# Patient Record
Sex: MALE | Race: WHITE | NOT HISPANIC OR LATINO | Employment: OTHER | URBAN - METROPOLITAN AREA
[De-identification: names, ages, dates, MRNs, and addresses within clinical notes are randomized per-mention and may not be internally consistent; named-entity substitution may affect disease eponyms.]

---

## 2019-10-24 ENCOUNTER — TRANSCRIBE ORDERS (OUTPATIENT)
Dept: LAB | Facility: HOSPITAL | Age: 69
End: 2019-10-24

## 2019-10-24 ENCOUNTER — LAB (OUTPATIENT)
Dept: LAB | Facility: HOSPITAL | Age: 69
End: 2019-10-24

## 2019-10-24 DIAGNOSIS — I82.4Z9 DEEP VEIN THROMBOSIS (DVT) OF DISTAL VEIN OF LOWER EXTREMITY, UNSPECIFIED CHRONICITY, UNSPECIFIED LATERALITY (HCC): ICD-10-CM

## 2019-10-24 DIAGNOSIS — I26.99 PULMONARY EMBOLISM, UNSPECIFIED CHRONICITY, UNSPECIFIED PULMONARY EMBOLISM TYPE, UNSPECIFIED WHETHER ACUTE COR PULMONALE PRESENT (HCC): ICD-10-CM

## 2019-10-24 DIAGNOSIS — I26.99 PULMONARY EMBOLISM, UNSPECIFIED CHRONICITY, UNSPECIFIED PULMONARY EMBOLISM TYPE, UNSPECIFIED WHETHER ACUTE COR PULMONALE PRESENT (HCC): Primary | ICD-10-CM

## 2019-10-24 LAB
F5 GENE MUT ANL BLD/T: NORMAL
FACTOR II, DNA ANALYSIS: NORMAL
HCYS SERPL-MCNC: 12.3 UMOL/L (ref 0–15)

## 2019-10-24 PROCEDURE — 85613 RUSSELL VIPER VENOM DILUTED: CPT

## 2019-10-24 PROCEDURE — 36415 COLL VENOUS BLD VENIPUNCTURE: CPT

## 2019-10-24 PROCEDURE — 86146 BETA-2 GLYCOPROTEIN ANTIBODY: CPT

## 2019-10-24 PROCEDURE — 85303 CLOT INHIBIT PROT C ACTIVITY: CPT

## 2019-10-24 PROCEDURE — 85306 CLOT INHIBIT PROT S FREE: CPT

## 2019-10-24 PROCEDURE — 81241 F5 GENE: CPT

## 2019-10-24 PROCEDURE — 83090 ASSAY OF HOMOCYSTEINE: CPT

## 2019-10-24 PROCEDURE — 85300 ANTITHROMBIN III ACTIVITY: CPT

## 2019-10-24 PROCEDURE — 83520 IMMUNOASSAY QUANT NOS NONAB: CPT

## 2019-10-24 PROCEDURE — 86147 CARDIOLIPIN ANTIBODY EA IG: CPT

## 2019-10-24 PROCEDURE — 85705 THROMBOPLASTIN INHIBITION: CPT

## 2019-10-24 PROCEDURE — 85670 THROMBIN TIME PLASMA: CPT

## 2019-10-24 PROCEDURE — 85732 THROMBOPLASTIN TIME PARTIAL: CPT

## 2019-10-24 PROCEDURE — 81240 F2 GENE: CPT

## 2019-10-25 LAB
AT III PPP CHRO-ACNC: 103 % (ref 90–134)
CARDIOLIPIN IGA SER IA-ACNC: <9 APL U/ML (ref 0–11)
CARDIOLIPIN IGG SER IA-ACNC: <9 GPL U/ML (ref 0–14)
CARDIOLIPIN IGM SER IA-ACNC: 9 MPL U/ML (ref 0–12)
PROT C ACT/NOR PPP: 109 % (ref 86–163)
PROT S ACT/NOR PPP: >150 % (ref 70–127)

## 2019-10-26 LAB
B2 GLYCOPROT1 IGA SER-ACNC: <9 GPI IGA UNITS (ref 0–25)
B2 GLYCOPROT1 IGG SER-ACNC: 31 GPI IGG UNITS (ref 0–20)
B2 GLYCOPROT1 IGM SER-ACNC: <9 GPI IGM UNITS (ref 0–32)
DRVVT IMM 1:2 NP PPP: 83.2 SEC (ref 0–47)
LA NT DPL PPP: 70.8 SEC (ref 0–55)
LA NT DPL/LA NT HPL PPP-RTO: 0.95 RATIO (ref 0–1.4)
LA NT PLATELET PPP: 51 SEC (ref 0–51.9)
LUPUS ANTICOAGULANT REFLEX: ABNORMAL
SCREEN DRVVT: 1.9 RATIO (ref 0.8–1.2)
SCREEN DRVVT: 119.3 SEC (ref 0–47)
THROMBIN TIME: 19.1 SEC (ref 0–23)

## 2019-10-31 LAB
ANTI-PHOSPHATIDIC ACID: ABNORMAL
ANTI-PHOSPHATIDIC,IGA: 11 U/ML
ANTI-PHOSPHATIDIC,IGG: 6.7 U/ML
ANTI-PHOSPHATIDIC,IGM: 7.2 U/ML
ANTI-PHOSPHATIDYL GLYCEROL, IGA: 7.3 U/ML
ANTI-PHOSPHATIDYL GLYCEROL, IGG: 10.1 U/ML
ANTI-PHOSPHATIDYL GLYCEROL, IGM: 5.4 U/ML
ANTI-PHOSPHATIDYL GLYCEROL: ABNORMAL
ANTI-PHOSPHATIDYL INOSITOL: ABNORMAL
ANTI-PHOSPHATIDYL,IGA: 4.6 U/ML
ANTI-PHOSPHATIDYL,IGG: 3 U/ML
ANTI-PHOSPHATIDYL,IGM: 3 U/ML
ANTI-PHOSPHATIDYLETHANOLAMINE, IGA: 3.2 U/ML
ANTI-PHOSPHATIDYLETHANOLAMINE, IGG: 33.3 U/ML
ANTI-PHOSPHATIDYLETHANOLAMINE, IGM: 2.4 U/ML
ANTI-PHOSPHATIDYLETHANOLAMINE: ABNORMAL

## 2020-01-07 ENCOUNTER — CONSULT (OUTPATIENT)
Dept: ONCOLOGY | Facility: CLINIC | Age: 70
End: 2020-01-07

## 2020-01-07 ENCOUNTER — LAB (OUTPATIENT)
Dept: LAB | Facility: HOSPITAL | Age: 70
End: 2020-01-07

## 2020-01-07 ENCOUNTER — HOSPITAL ENCOUNTER (OUTPATIENT)
Dept: CT IMAGING | Facility: HOSPITAL | Age: 70
End: 2020-01-07

## 2020-01-07 ENCOUNTER — HOSPITAL ENCOUNTER (OUTPATIENT)
Dept: CARDIOLOGY | Facility: HOSPITAL | Age: 70
Discharge: HOME | End: 2020-01-07
Admitting: INTERNAL MEDICINE

## 2020-01-07 VITALS
HEIGHT: 71 IN | DIASTOLIC BLOOD PRESSURE: 79 MMHG | TEMPERATURE: 97.6 F | SYSTOLIC BLOOD PRESSURE: 148 MMHG | BODY MASS INDEX: 29.71 KG/M2 | OXYGEN SATURATION: 98 % | RESPIRATION RATE: 16 BRPM | WEIGHT: 212.2 LBS | HEART RATE: 60 BPM

## 2020-01-07 DIAGNOSIS — M32.9 LUPUS (HCC): Primary | ICD-10-CM

## 2020-01-07 DIAGNOSIS — I26.99 BILATERAL PULMONARY EMBOLISM (HCC): ICD-10-CM

## 2020-01-07 DIAGNOSIS — I82.411 ACUTE DEEP VEIN THROMBOSIS (DVT) OF FEMORAL VEIN OF RIGHT LOWER EXTREMITY (HCC): ICD-10-CM

## 2020-01-07 DIAGNOSIS — R76.0 LUPUS ANTICOAGULANT POSITIVE: Primary | ICD-10-CM

## 2020-01-07 DIAGNOSIS — Z80.0 FAMILY HISTORY OF COLON CANCER: ICD-10-CM

## 2020-01-07 LAB
BASOPHILS # BLD AUTO: 0.05 10*3/MM3 (ref 0–0.2)
BASOPHILS NFR BLD AUTO: 0.5 % (ref 0–1.5)
BH CV LOW VAS RIGHT GASTRONEMIUS VESSEL: 1
BH CV LOWER VASCULAR LEFT COMMON FEMORAL AUGMENT: NORMAL
BH CV LOWER VASCULAR LEFT COMMON FEMORAL COMPETENT: NORMAL
BH CV LOWER VASCULAR LEFT COMMON FEMORAL COMPRESS: NORMAL
BH CV LOWER VASCULAR LEFT COMMON FEMORAL PHASIC: NORMAL
BH CV LOWER VASCULAR LEFT COMMON FEMORAL SPONT: NORMAL
BH CV LOWER VASCULAR LEFT DISTAL FEMORAL COMPRESS: NORMAL
BH CV LOWER VASCULAR LEFT GASTRONEMIUS COMPRESS: NORMAL
BH CV LOWER VASCULAR LEFT GREATER SAPH AK COMPRESS: NORMAL
BH CV LOWER VASCULAR LEFT GREATER SAPH BK COMPRESS: NORMAL
BH CV LOWER VASCULAR LEFT MID FEMORAL AUGMENT: NORMAL
BH CV LOWER VASCULAR LEFT MID FEMORAL COMPETENT: NORMAL
BH CV LOWER VASCULAR LEFT MID FEMORAL COMPRESS: NORMAL
BH CV LOWER VASCULAR LEFT MID FEMORAL PHASIC: NORMAL
BH CV LOWER VASCULAR LEFT MID FEMORAL SPONT: NORMAL
BH CV LOWER VASCULAR LEFT PERONEAL COMPRESS: NORMAL
BH CV LOWER VASCULAR LEFT POPLITEAL AUGMENT: NORMAL
BH CV LOWER VASCULAR LEFT POPLITEAL COMPETENT: NORMAL
BH CV LOWER VASCULAR LEFT POPLITEAL COMPRESS: NORMAL
BH CV LOWER VASCULAR LEFT POPLITEAL PHASIC: NORMAL
BH CV LOWER VASCULAR LEFT POPLITEAL SPONT: NORMAL
BH CV LOWER VASCULAR LEFT POSTERIOR TIBIAL COMPRESS: NORMAL
BH CV LOWER VASCULAR LEFT PROFUNDA FEMORAL COMPRESS: NORMAL
BH CV LOWER VASCULAR LEFT PROXIMAL FEMORAL COMPRESS: NORMAL
BH CV LOWER VASCULAR LEFT SAPHENOFEMORAL JUNCTION COMPRESS: NORMAL
BH CV LOWER VASCULAR RIGHT COMMON FEMORAL AUGMENT: NORMAL
BH CV LOWER VASCULAR RIGHT COMMON FEMORAL COMPETENT: NORMAL
BH CV LOWER VASCULAR RIGHT COMMON FEMORAL COMPRESS: NORMAL
BH CV LOWER VASCULAR RIGHT COMMON FEMORAL PHASIC: NORMAL
BH CV LOWER VASCULAR RIGHT COMMON FEMORAL SPONT: NORMAL
BH CV LOWER VASCULAR RIGHT DISTAL FEMORAL COMPRESS: NORMAL
BH CV LOWER VASCULAR RIGHT DISTAL FEMORAL THROMBUS: NORMAL
BH CV LOWER VASCULAR RIGHT GASTRONEMIUS COMPRESS: NORMAL
BH CV LOWER VASCULAR RIGHT GASTRONEMIUS THROMBUS: NORMAL
BH CV LOWER VASCULAR RIGHT GREATER SAPH AK COMPRESS: NORMAL
BH CV LOWER VASCULAR RIGHT GREATER SAPH BK COMPRESS: NORMAL
BH CV LOWER VASCULAR RIGHT MID FEMORAL AUGMENT: NORMAL
BH CV LOWER VASCULAR RIGHT MID FEMORAL COMPETENT: NORMAL
BH CV LOWER VASCULAR RIGHT MID FEMORAL COMPRESS: NORMAL
BH CV LOWER VASCULAR RIGHT MID FEMORAL PHASIC: NORMAL
BH CV LOWER VASCULAR RIGHT MID FEMORAL SPONT: NORMAL
BH CV LOWER VASCULAR RIGHT PERONEAL COMPRESS: NORMAL
BH CV LOWER VASCULAR RIGHT POPLITEAL AUGMENT: NORMAL
BH CV LOWER VASCULAR RIGHT POPLITEAL COMPETENT: NORMAL
BH CV LOWER VASCULAR RIGHT POPLITEAL COMPRESS: NORMAL
BH CV LOWER VASCULAR RIGHT POPLITEAL PHASIC: NORMAL
BH CV LOWER VASCULAR RIGHT POPLITEAL SPONT: NORMAL
BH CV LOWER VASCULAR RIGHT POPLITEAL THROMBUS: NORMAL
BH CV LOWER VASCULAR RIGHT POSTERIOR TIBIAL COMPRESS: NORMAL
BH CV LOWER VASCULAR RIGHT PROFUNDA FEMORAL COMPRESS: NORMAL
BH CV LOWER VASCULAR RIGHT PROXIMAL FEMORAL COMPRESS: NORMAL
BH CV LOWER VASCULAR RIGHT SAPHENOFEMORAL JUNCTION COMPRESS: NORMAL
DEPRECATED RDW RBC AUTO: 47.3 FL (ref 37–54)
EOSINOPHIL # BLD AUTO: 0.19 10*3/MM3 (ref 0–0.4)
EOSINOPHIL NFR BLD AUTO: 1.8 % (ref 0.3–6.2)
ERYTHROCYTE [DISTWIDTH] IN BLOOD BY AUTOMATED COUNT: 14.3 % (ref 12.3–15.4)
HCT VFR BLD AUTO: 46.4 % (ref 37.5–51)
HGB BLD-MCNC: 14.7 G/DL (ref 13–17.7)
IMM GRANULOCYTES # BLD AUTO: 0.04 10*3/MM3 (ref 0–0.05)
IMM GRANULOCYTES NFR BLD AUTO: 0.4 % (ref 0–0.5)
LYMPHOCYTES # BLD AUTO: 2.54 10*3/MM3 (ref 0.7–3.1)
LYMPHOCYTES NFR BLD AUTO: 24.1 % (ref 19.6–45.3)
MCH RBC QN AUTO: 28.5 PG (ref 26.6–33)
MCHC RBC AUTO-ENTMCNC: 31.7 G/DL (ref 31.5–35.7)
MCV RBC AUTO: 90.1 FL (ref 79–97)
MONOCYTES # BLD AUTO: 0.84 10*3/MM3 (ref 0.1–0.9)
MONOCYTES NFR BLD AUTO: 8 % (ref 5–12)
NEUTROPHILS # BLD AUTO: 6.9 10*3/MM3 (ref 1.7–7)
NEUTROPHILS NFR BLD AUTO: 65.2 % (ref 42.7–76)
NRBC BLD AUTO-RTO: 0 /100 WBC (ref 0–0.2)
PLATELET # BLD AUTO: 283 10*3/MM3 (ref 140–450)
PMV BLD AUTO: 9.1 FL (ref 6–12)
RBC # BLD AUTO: 5.15 10*6/MM3 (ref 4.14–5.8)
WBC NRBC COR # BLD: 10.56 10*3/MM3 (ref 3.4–10.8)

## 2020-01-07 PROCEDURE — 99205 OFFICE O/P NEW HI 60 MIN: CPT | Performed by: INTERNAL MEDICINE

## 2020-01-07 PROCEDURE — 93970 EXTREMITY STUDY: CPT

## 2020-01-07 PROCEDURE — 36415 COLL VENOUS BLD VENIPUNCTURE: CPT

## 2020-01-07 PROCEDURE — 85025 COMPLETE CBC W/AUTO DIFF WBC: CPT

## 2020-01-07 RX ORDER — RIVAROXABAN 20 MG/1
TABLET, FILM COATED ORAL
COMMUNITY
Start: 2020-01-06 | End: 2020-05-26 | Stop reason: SDUPTHER

## 2020-01-07 RX ORDER — LORATADINE 10 MG/1
10 TABLET ORAL AS NEEDED
COMMUNITY

## 2020-01-07 RX ORDER — MONTELUKAST SODIUM 10 MG/1
TABLET ORAL
COMMUNITY
Start: 2019-12-23 | End: 2020-11-10 | Stop reason: SDDI

## 2020-01-07 NOTE — PROGRESS NOTES
1/7/20 Bilateral LEV duplex preliminary findings show right leg and calf sub-acute DVT involving the popliteal vein. Preliminary report given to Dr. Rivera and pt is pending re-schedule for CT due to allergy to contrast. Pt released per Dr. Rivera's f/u instructions.

## 2020-01-07 NOTE — PROGRESS NOTES
Subjective     REASON FOR CONSULTATION:  Provide an opinion on any further workup or treatment on:    · Bilateral pulmonary embolism  · Right lower extremity DVT  · Positive test for antiphospholipid antibodies                       REQUESTING PHYSICIAN: Dr. Waite    RECORDS OBTAINED: Records of the patients history including those obtained from the referring provider were reviewed and summarized in detail.    HISTORY OF PRESENT ILLNESS:    Jerad Torres is a 69 y.o. patient who was referred for evaluation of evaluation of DVT, pulmonary embolism and positive antiphospholipid antibody tests.  Patient was at his usual state of health until August 2019 when he went on a cruise to the [facility].  He was feeling tired at that time and his wife states that he did not do much on the trip.  The patient reports that he started feeling tired quite easily.  He felt that his energy level decreased to about 60% of his normal.  He was not experiencing chest pain or shortness of breath.  Patient went on a car trip to [place].  The trip took about 8-1/2 hours.  They took 2 breaks on the way.  When he started walking on the beach 2 days after, he started having what he described as muscle cramps in the right leg.  The symptoms worsened as he was going up the stairs.  His sister who has a history of DVT noticed that his leg was swollen and was larger than the left.  He was not experiencing chest pain but he was feeling tired.  He was taken to urgent care and a venous Doppler was done that showed DVT from the femoral vein to the calf.  He went to the ER and was admitted to [facility].  CT angiogram of the chest revealed extensive bilateral pulmonary embolism.  He was hospitalized.  He was started on IV heparin drip.  His oxygen level improved to 94-96%.  After a 3-day hospital stay, he was switched to Xarelto twice daily and was discharged.  He was asked to wait 1 week before traveling back by plane to [place].    When the patient came back to [place], he saw  Dr. Josh Waite.  He had thrombophilia work-up done which came back positive for antiphospholipid antibodies.  He was referred to our office.    Patient states that his energy level is currently at 70% (was 60% at the time of diagnosis of the DVT and PE).  He did not notice significant improvement in this fatigue.  He continues to have swelling of the right leg despite the use of compression stockings.  He reported to his wife that he had muscle cramps in the left leg yesterday which was similar to what he experienced when he developed the right lower extremity DVT.    Patient is taking Xarelto regularly which is currently once daily.  He had a recent episode of hitting the left forearm on an object.  He did not notice significant bleeding from the area.       REVIEW OF SYSTEMS:  Review of Systems   Constitutional: Positive for fatigue. Negative for chills, fever and unexpected weight change.   HENT: Negative for mouth sores, nosebleeds, sore throat and voice change.    Eyes: Negative for visual disturbance.   Respiratory: Positive for cough and shortness of breath.    Cardiovascular: Negative for chest pain and leg swelling.   Gastrointestinal: Negative for abdominal pain, blood in stool, constipation, diarrhea, nausea and vomiting.        Hemorrhoids   Genitourinary: Negative for dysuria, frequency and hematuria.   Musculoskeletal: Negative for arthralgias, back pain and joint swelling.   Skin: Negative for rash.   Neurological: Negative for dizziness, numbness and headaches.   Hematological: Negative for adenopathy. Does not bruise/bleed easily.   Psychiatric/Behavioral: Negative for dysphoric mood. The patient is not nervous/anxious.          Past Medical History:   Diagnosis Date   • Allergic rhinitis    • Hyperlipidemia    • MICHAEL (obstructive sleep apnea)    • Pulmonary embolism (CMS/[facility]) 10/2019    RLE       Past Surgical History:   Procedure  "Laterality Date   • BACK SURGERY     • NEPHROLITHOTOMY         Social History     Socioeconomic History   • Marital status:      Spouse name: Sara   • Number of children: Not on file   • Years of education: Not on file   • Highest education level: Not on file   Occupational History     Employer: RETIRED   Tobacco Use   • Smoking status: Never Smoker   • Smokeless tobacco: Never Used   Substance and Sexual Activity   • Alcohol use: Never     Frequency: Never   • Drug use: Never   • Sexual activity: Defer       Cancer-related family history includes Colon cancer in his father.   Sister has history of DVT and is on Eliquis.    MEDICATIONS:    Current Outpatient Medications:   •  loratadine (CLARITIN) 10 MG tablet, Take 10 mg by mouth Daily., Disp: , Rfl:   •  montelukast (SINGULAIR) 10 MG tablet, TAKE ONE TABLET BY MOUTH ONCE A DAY IN THE EVENING, Disp: , Rfl:   •  XARELTO 20 MG tablet, , Disp: , Rfl:      ALLERGIES:  Allergies   Allergen Reactions   • Sulfa Antibiotics Nausea Only        Objective   VITAL SIGNS:  Vitals:    01/07/20 1447   BP: 148/79   Pulse: 60   Resp: 16   Temp: 97.6 °F (36.4 °C)   TempSrc: Oral   SpO2: 98%   Weight: 96.3 kg (212 lb 3.2 oz)   Height: 180 cm (70.87\")  Comment: new ht   PainSc: 0-No pain       Wt Readings from Last 3 Encounters:   01/07/20 96.3 kg (212 lb 3.2 oz)       PHYSICAL EXAMINATION  GENERAL:  The patient appears in good general condition, not in acute distress.  SKIN: Warm and dry. No skin rashes. No ecchymosis.  HEAD:  Normocephalic.  EYES:  No Jaundice. No Pallor. Pupils equal. EOMI.  NECK:  Supple with Good ROM. No Thyromegaly. No Masses.  LYMPHATICS:  No cervical or supraclavicular lymphadenopathy.  CHEST: Normal respiratory effort. Lungs clear to auscultation.   CARDIAC:  No edema.  ABDOMEN:  Soft. No tenderness. No Hepatomegaly. No Splenomegaly. No masses.  EXTREMITIES:  No clubbing.  Right calf is larger than the left.  There is slightly prominent superficial " veins in the right medial aspect of the leg.  No Calf tenderness.  NEUROLOGICAL:  No Focal neurological deficits.       RESULT REVIEW:   Results from last 7 days   Lab Units 01/07/20  1436   WBC 10*3/mm3 10.56   NEUTROS ABS 10*3/mm3 6.90   HEMOGLOBIN g/dL 14.7   HEMATOCRIT % 46.4   PLATELETS 10*3/mm3 283       Component      Latest Ref Rng & Units 10/24/2019   Anti-Phosphatidyl,IgA      <12.0 U/mL 4.6   Anti-Phosphatidyl,IgG      <12.0 U/mL 3.0   Anti-Phosphatidyl,IgM      <12.0 U/mL 3.0   Anti-Phosphatidic Acid       Comment   Anti-Phosphatidic,IgA      <12.0 U/mL 11.0   Anti-Phosphatidic,IgG      <12.0 U/mL 6.7   Anti-Phosphatidic,IgM      <12.0 U/mL 7.2   Anti-Phosphatidylethanolamine       Comment   Anti-Phosphatidylethanolamine, IgA      <12.0 U/mL 3.2   Anti-Phosphatidylethanolamine, IgG      <12.0 U/mL 33.3 (H)   Anti-Phosphatidylethanolamine, IgM      <12.0 U/mL 2.4   Anti-Phosphatidyl Glycerol       Comment   Anti-Phosphatidyl Glycerol, IgA      <12.0 U/mL 7.3   Anti-Phosphatidyl Glycerol, IgG      <12.0 U/mL 10.1   Anti-Phosphatidyl Glycerol, IgM      <12.0 U/mL 5.4   Anticardiolipin IgG      0 - 14 GPL U/mL <9   Anticardiolipin IgM      0 - 12 MPL U/mL 9   Anticardiolipin IgA      0 - 11 APL U/mL <9   Beta-2 Glyco 1 IgG      0 - 20 GPI IgG units 31 (H)   Beta-2 Glyco 1 IgA      0 - 25 GPI IgA units <9   Beta-2 Glyco 1 IgM      0 - 32 GPI IgM units <9   Factor II, DNA Analysis      Normal Normal   ANTITHROMBIN ACTIVITY      90 - 134 % 103   Factor V Leiden      Normal Normal   Protein C Activity      86 - 163 % 109   Protein S Functional      70 - 127 % >150 (H)   Homocysteine, Plasma      0.0 - 15.0 umol/L 12.3       Assessment/Plan   1.  Bilateral pulmonary embolism diagnosed on 10/8/2019.  The CT scan at [facility] reported bilateral extensive pulmonary embolism.  The largest was in the right main pulmonary artery into the lobar arteries.  There was suspected infarction in the left lung  base.    2.  Acute right lower extremity deep vein thrombosis.  Also diagnosed on 10/8/2019.  The DVT and PE developed after driving by car to [place].  Although this appears to be the triggering factor, he has gone on this trip numerous times in the past without ever developing DVT or PE.    3.  Positive test for lupus anticoagulant, anti-phosphatidyl ethanolamine IgG antibody and anti-beta-2 glycoprotein IgG antibody.  While the positive lupus anticoagulant may have been a false positive test due to Xarelto, the other 2 positive antibodies are concerning for an underlying antiphospholipid syndrome.  The patient's family history is positive for DVT in a sister, she had negative thrombophilia testing.  Patient does not have a hereditary thrombophilia on the blood work-up from 10/24/2019.  I explained to the patient and his wife that antiphospholipid syndrome, if confirmed through repeat testing, is an acquired condition.  He does not need to have family members tested for this condition since it is acquired.    4.  Family history positive for colon cancer.  Patient is undergoing colonoscopies every 5 years.  The last colonoscopy was clear.  We discussed the need to continue with this schedule every 5 years and he is very agreeable.    PLAN:    1.  I will obtain anticardiolipin, anti-beta-2 glycoprotein and anti-phosphatidyl antibodies today.    2.  I will not repeat lupus anticoagulant since it would be expected to be positive with the presence of Xarelto.    3.  I will obtain a CT angiogram and a venous Doppler of both lower extremities in 1-2 days.    4.  I will see the patient follow-up in 1 week to review results.        Kat Umaña MD  01/07/20

## 2020-01-08 ENCOUNTER — TELEPHONE (OUTPATIENT)
Dept: ONCOLOGY | Facility: CLINIC | Age: 70
End: 2020-01-08

## 2020-01-08 LAB
CARDIOLIPIN IGA SER IA-ACNC: <9 APL U/ML (ref 0–11)
CARDIOLIPIN IGG SER IA-ACNC: <9 GPL U/ML (ref 0–14)
CARDIOLIPIN IGM SER IA-ACNC: <9 MPL U/ML (ref 0–12)

## 2020-01-08 RX ORDER — PREDNISONE 50 MG/1
TABLET ORAL
Qty: 3 TABLET | Refills: 0 | Status: SHIPPED | OUTPATIENT
Start: 2020-01-08 | End: 2020-11-10

## 2020-01-08 NOTE — TELEPHONE ENCOUNTER
Patient's wife called to see why premeds for CT were not sent in. Pt has an allergy to contrast dye that was not listed in pt's chart. This has been updated. Prednisone escribed to pt's pharmacy and verbal instructions provided for when to take both benadryl and prednisone prior to scan tomorrow morning. Pt's wife v/u.

## 2020-01-09 ENCOUNTER — HOSPITAL ENCOUNTER (OUTPATIENT)
Dept: CT IMAGING | Facility: HOSPITAL | Age: 70
Discharge: HOME | End: 2020-01-09
Admitting: INTERNAL MEDICINE

## 2020-01-09 ENCOUNTER — TELEPHONE (OUTPATIENT)
Dept: ONCOLOGY | Facility: HOSPITAL | Age: 70
End: 2020-01-09

## 2020-01-09 DIAGNOSIS — I26.99 BILATERAL PULMONARY EMBOLISM (HCC): ICD-10-CM

## 2020-01-09 LAB
B2 GLYCOPROT1 IGA SER-ACNC: <9 GPI IGA UNITS (ref 0–25)
B2 GLYCOPROT1 IGG SER-ACNC: 39 GPI IGG UNITS (ref 0–20)
B2 GLYCOPROT1 IGM SER-ACNC: <9 GPI IGM UNITS (ref 0–32)
CREAT BLDA-MCNC: 1 MG/DL (ref 0.6–1.3)
PS IGG SER-ACNC: 7 GPS IGG (ref 0–11)
PS IGM SER-ACNC: 23 MPS IGM (ref 0–25)

## 2020-01-09 PROCEDURE — 82565 ASSAY OF CREATININE: CPT

## 2020-01-09 PROCEDURE — 71275 CT ANGIOGRAPHY CHEST: CPT

## 2020-01-09 PROCEDURE — 0 IOPAMIDOL PER 1 ML: Performed by: INTERNAL MEDICINE

## 2020-01-09 RX ADMIN — IOPAMIDOL 95 ML: 755 INJECTION, SOLUTION INTRAVENOUS at 10:17

## 2020-01-09 NOTE — TELEPHONE ENCOUNTER
Arnulfo Vincent in scheduling who s/w Dr. Umaña, call pt's wife and inform her of results of the duplex scan. The right LE DVT is improving. He will see pt on Tuesday for f/u.  Informed pt's wife and she v/u. No further questions.

## 2020-01-09 NOTE — NURSING NOTE
Patient here for CTA Chest PE Protocol outpatient.  Wife states that they are wanting to see Dr. Umaña today if at all possible and they have been trying to contact his office all day without success and are having difficulty. They have an appt on 01/14/2020 with Dr. Umaña but they live 75 miles away and are concerned because he had a follow-up doppler to his legs and the DVT is unchanged, they feel that the Xarelto is not working and do not want to wait until the 14th to discuss this with Dr. Umaña.  Informed them that they will wait after the CT to have the Radiologist read this STAT and they will discuss results with Dr. Umaña while they are waiting here in Triage. D/w Yelena the Nurse Navigator at [facility].  She said that Dr. Umaña is here today and may possibly be able to see them today, she will mention this to him.  Informed patient and wife of above.

## 2020-01-10 ENCOUNTER — TELEPHONE (OUTPATIENT)
Dept: ONCOLOGY | Facility: HOSPITAL | Age: 70
End: 2020-01-10

## 2020-01-10 NOTE — TELEPHONE ENCOUNTER
Called pt and informed him of results. He v/u.       ----- Message from Kat Umaña MD sent at 1/10/2020  9:45 AM EST -----  Regarding: CT scan  Please inform the patient that the CT scan of the chest showed resolution of the blood clots.     Thank you

## 2020-01-14 ENCOUNTER — OFFICE VISIT (OUTPATIENT)
Dept: ONCOLOGY | Facility: CLINIC | Age: 70
End: 2020-01-14

## 2020-01-14 ENCOUNTER — APPOINTMENT (OUTPATIENT)
Dept: LAB | Facility: HOSPITAL | Age: 70
End: 2020-01-14

## 2020-01-14 VITALS
TEMPERATURE: 97.4 F | OXYGEN SATURATION: 98 % | RESPIRATION RATE: 16 BRPM | DIASTOLIC BLOOD PRESSURE: 81 MMHG | HEART RATE: 68 BPM | BODY MASS INDEX: 30.45 KG/M2 | WEIGHT: 217.5 LBS | HEIGHT: 71 IN | SYSTOLIC BLOOD PRESSURE: 149 MMHG

## 2020-01-14 DIAGNOSIS — I82.411 ACUTE DEEP VEIN THROMBOSIS (DVT) OF FEMORAL VEIN OF RIGHT LOWER EXTREMITY (HCC): Primary | ICD-10-CM

## 2020-01-14 DIAGNOSIS — J98.11 ATELECTASIS: ICD-10-CM

## 2020-01-14 DIAGNOSIS — R76.0 LUPUS ANTICOAGULANT POSITIVE: ICD-10-CM

## 2020-01-14 DIAGNOSIS — Z79.01 CHRONIC ANTICOAGULATION: ICD-10-CM

## 2020-01-14 DIAGNOSIS — I26.99 BILATERAL PULMONARY EMBOLISM (HCC): ICD-10-CM

## 2020-01-14 LAB
ANTI-PHOSPHATIDYLETHANOLAMINE, IGA: NORMAL
ANTI-PHOSPHATIDYLETHANOLAMINE, IGG: NORMAL
ANTI-PHOSPHATIDYLETHANOLAMINE, IGM: NORMAL

## 2020-01-14 PROCEDURE — 99215 OFFICE O/P EST HI 40 MIN: CPT | Performed by: INTERNAL MEDICINE

## 2020-01-14 NOTE — PROGRESS NOTES
Subjective     CHIEF COMPLAINT:      Chief Complaint   Patient presents with   • Follow-up     discuss ct scan and ultrasound       HISTORY OF PRESENT ILLNESS:     Jerad Torres is a 69 y.o. male patient who returns today for follow up on his DVT and PE. He continues anticoagulation with Xarelto 20 mg daily. He is tolerating it well. He is not having problem with bleeding or bruising. He is not having chest pain. He has shortness of breath with exertion but reports improvement.      Patient is using the compression stocking. He reports some right leg swelling especially toward the end of the day.         REVIEW OF SYSTEMS:  Review of Systems   Constitutional: Negative for chills, fever and unexpected weight change.   HENT: Negative for mouth sores, nosebleeds, sore throat and voice change.    Eyes: Negative for visual disturbance.   Respiratory: Negative for cough and shortness of breath.    Cardiovascular: Negative for chest pain and leg swelling.   Gastrointestinal: Negative for abdominal pain, blood in stool, constipation, diarrhea, nausea and vomiting.   Genitourinary: Negative for dysuria, frequency and hematuria.   Musculoskeletal: Negative for arthralgias, back pain and joint swelling.   Skin: Negative for rash.   Neurological: Negative for dizziness, numbness and headaches.   Hematological: Negative for adenopathy. Does not bruise/bleed easily.   Psychiatric/Behavioral: Negative for dysphoric mood. The patient is not nervous/anxious.      I verified the ROS obtained by the MA.      Past Medical History:   Diagnosis Date   • Allergic rhinitis    • Hyperlipidemia    • MICHAEL (obstructive sleep apnea)    • Pulmonary embolism (CMS/HCC) 10/2019    RLE       Past Surgical History:   Procedure Laterality Date   • BACK SURGERY     • NEPHROLITHOTOMY         Cancer-related family history includes Colon cancer (age of onset: 70) in his father.  Social History     Tobacco Use   • Smoking status: Never Smoker   • Smokeless  "tobacco: Never Used   Substance Use Topics   • Alcohol use: Never     Frequency: Never       MEDICATIONS:    Current Outpatient Medications:   •  loratadine (CLARITIN) 10 MG tablet, Take 10 mg by mouth As Needed., Disp: , Rfl:   •  montelukast (SINGULAIR) 10 MG tablet, TAKE ONE TABLET BY MOUTH ONCE A DAY IN THE EVENING, Disp: , Rfl:   •  predniSONE (DELTASONE) 50 MG tablet, Take prednisone 50 mg by mouth 13 hours, 7 hours and 1 hour prior to CT scan., Disp: 3 tablet, Rfl: 0  •  XARELTO 20 MG tablet, , Disp: , Rfl:     ALLERGIES:  Allergies   Allergen Reactions   • Contrast Dye Itching   • Sulfa Antibiotics Nausea Only         Objective   VITAL SIGNS:     Vitals:    01/14/20 1213   BP: 149/81   Pulse: 68   Resp: 16   Temp: 97.4 °F (36.3 °C)   TempSrc: Oral   SpO2: 98%   Weight: 98.7 kg (217 lb 8 oz)   Height: 180 cm (70.87\")   PainSc: 0-No pain     Body mass index is 30.45 kg/m².     Wt Readings from Last 3 Encounters:   01/14/20 98.7 kg (217 lb 8 oz)   01/07/20 96.3 kg (212 lb 3.2 oz)       PHYSICAL EXAMINATION:  GENERAL:  The patient appears in good general condition, not in acute distress.  SKIN: Warm and dry. No skin rashes. No ecchymosis.  HEAD:  Normocephalic.  EYES:  No Jaundice. No Pallor. Pupils equal. EOMI.  NECK:  Supple with Good ROM. No Thyromegaly. No Masses.  LYMPHATICS:  No cervical or supraclavicular lymphadenopathy.  CHEST: Normal respiratory effort.   CARDIAC:  No edema.  ABDOMEN:  Non-distended.  EXTREMITIES:  No clubbing. Right leg is slightly larger than the left. A small varicose vein in the right leg, non tender. No Calf tenderness.  NEUROLOGICAL:  No Focal neurological deficits.         DIAGNOSTIC DATA:     Results from last 7 days   Lab Units 01/07/20  1436   WBC 10*3/mm3 10.56   NEUTROS ABS 10*3/mm3 6.90   HEMOGLOBIN g/dL 14.7   HEMATOCRIT % 46.4   PLATELETS 10*3/mm3 283     Component      Latest Ref Rng & Units 1/7/2020   Beta-2 Glyco 1 IgG      0 - 20 GPI IgG units 39 (H)   Beta-2 Glyco 1 " IgA      0 - 25 GPI IgA units <9   Beta-2 Glyco 1 IgM      0 - 32 GPI IgM units <9   Anticardiolipin IgG      0 - 14 GPL U/mL <9   Anticardiolipin IgM      0 - 12 MPL U/mL <9   Anticardiolipin IgA      0 - 11 APL U/mL <9   Anti-Phosphatidylethanolamine, IgA          Anti-Phosphatidylethanolamine, IgG          Anti-Phosphatidylethanolamine, IgM          Antiphosphatidylserine IgM      0 - 25 MPS IgM 23   Antiphosphatidylserine IgG      0 - 11 GPS IgG 7       Venous Doppler of both lower extremities on 1/7/2020:  · Sub-acute right lower extremity deep vein thrombosis noted in the distal femoral, popliteal and gastrocnemius/soleal.  · All other veins appeared normal bilaterally.      CT ANGIOGRAM CHEST W WO CONTRAST on 1/9/2020:     Radiation dose reduction techniques were utilized, including automated  exposure control and exposure modulation based on body size.     Clinical: 69-year-old male, follow-up DVT right lower extremity,  bilateral pulmonary embolism 10/8/2019     CT scan of the chest with intravenous contrast, pulmonary embolus  protocol to include CT angiogram three-dimensional reconstruction     FINDINGS: Cardiac size within normal limits. No pericardial or  esophageal abnormality. Diameter of the aorta is normal. No aneurysm or  dissection. No mediastinal or hilar mass/adenopathy. There are small  calcified benign mediastinal and right hilar lymph nodes. There is  calcified benign granuloma within the right lower lobe.     At the base of the left upper and lower lobes there are linear areas of  discoid atelectasis or parenchymal scarring. No consolidation, pleural  effusion or suspicious pulmonary lesion demonstrated.     Thin axial CT Naty reconstructed images fail to demonstrate pulmonary  embolus.     CONCLUSION:  1. No pulmonary embolus demonstrated.  2. No acute airspace disease.        This report was finalized on 1/9/2020 10:45 AM by Dr. Pako Herrera M.D.    I personally reviewed the CT  angiogram of the chest with the patient and his wife during today's visit and I concur with the finding of resolution of the pulmonary embolism. There is evidence of atelectasis.      Assessment/Plan   1.  Right lower extremity deep vein thrombosis and bilateral pulmonary embolism diagnosed on 10/8/2019.    · The CT scan at [facility] reported bilateral extensive pulmonary embolism.    · The largest was in the right main pulmonary artery into the lobar arteries.    · There was suspected infarction in the left lung base.  · Venous doppler showed acute right lower extremity deep vein thrombosis in the femoral, popliteal and gastrocnemius veins.   · The DVT and PE developed after driving by car to [place].    · Patient was placed on IV heparin and discharged home on Xarelto.  · Venous doppler on 1/7/2020 showed the thrombosis to have become subacute.  · CT scan of the chest on 1/9/2020 showed complete resolution of the pulmonary embolism.   · Although this appears to be the triggering factor, he has gone on this trip numerous times in the past without ever developing DVT or PE.  · I explained the findings on the venous doppler that they show improvement in the thrombosis with evidence of blood flow.      2.  Positive test for lupus anticoagulant, anti-phosphatidyl ethanolamine IgG antibody and anti-beta-2 glycoprotein IgG antibody on 10/24/2019.   · The positive lupus anticoagulant may have been a false positive test due to Xarelto.  · the other 2 positive antibodies are concerning for an underlying antiphospholipid syndrome. However, only anti beta-2 glycoprotein antibody is considered clinically significant for a diagnosis of antiphospholipid syndrome.   · The patient's family history is positive for DVT in a sister who had a negative thrombophilia testing.   · Repeat testing on 1/7/2020 showed that the anti beta-2 glycoprotein was still positive. The titer was slightly higher. Anticardiolipin  antibody was negative.  · Since the patient is responding well to Xarelto and his PE has resolved, I recommended continuing Xarelto.  · I explained that in patients who are considered triple positive for the antibodies (Anticardiolipin Ab, Beta-2 Glycoprotein Ab and Lupus Anticoagulant), Warfarin is recommended over DOAC. The patient only has a definite single positive antibody from this panel.     3.  Lung atelectasis.  This is likely attributed to hypoventilation since the development of the pulmonary embolism.     4.  Family history positive for colon cancer.  He is having follow up colonoscopies every 5 years.     PLAN:    1. Continue Xarelto 20 mg daily.  2. Await the result of the remaining antiphospholipid antibody panel.   3.  Use compression stocking during the day time and especially when travelling.  4.  Return for follow up in 3 months with repeat venous doppler of the right lower extremity 1 week before his return visit.   5.  Due to the significant degree of pulmonary embolism, I recommended continuing prophylactic anticoagulation indefinitely.        Kat Umaña MD  01/14/20

## 2020-01-15 LAB
ANTI-PHOSPHATIDIC ACID: ABNORMAL
ANTI-PHOSPHATIDIC,IGA: 7.4 U/ML
ANTI-PHOSPHATIDIC,IGG: 3.6 U/ML
ANTI-PHOSPHATIDIC,IGM: 2.1 U/ML
ANTI-PHOSPHATIDYL GLYCEROL, IGA: 4.1 U/ML
ANTI-PHOSPHATIDYL GLYCEROL, IGG: 7.3 U/ML
ANTI-PHOSPHATIDYL GLYCEROL, IGM: 3 U/ML
ANTI-PHOSPHATIDYL GLYCEROL: ABNORMAL
ANTI-PHOSPHATIDYL INOSITOL: ABNORMAL
ANTI-PHOSPHATIDYL,IGA: 3.1 U/ML
ANTI-PHOSPHATIDYL,IGG: 2.8 U/ML
ANTI-PHOSPHATIDYL,IGM: 1.2 U/ML
ANTI-PHOSPHATIDYLETHANOLAMINE, IGA: 4.3 U/ML
ANTI-PHOSPHATIDYLETHANOLAMINE, IGG: 18.3 U/ML
ANTI-PHOSPHATIDYLETHANOLAMINE, IGM: 0.8 U/ML
ANTI-PHOSPHATIDYLETHANOLAMINE: ABNORMAL

## 2020-01-16 ENCOUNTER — TELEPHONE (OUTPATIENT)
Dept: ONCOLOGY | Facility: HOSPITAL | Age: 70
End: 2020-01-16

## 2020-01-16 NOTE — TELEPHONE ENCOUNTER
Called and spoke with pt, he v/u of continuing xarelto as planned.  Educated to call for questions or concerns        ----- Message from Kat Umaña MD sent at 1/15/2020  5:29 PM EST -----  Please inform the patient that the lab tests came back and 1 antibody is still positive but the level has improved since October 2019.  I would recommend continuing Xarelto as planned.    Thank you

## 2020-04-14 ENCOUNTER — APPOINTMENT (OUTPATIENT)
Dept: CARDIOLOGY | Facility: HOSPITAL | Age: 70
End: 2020-04-14

## 2020-05-14 ENCOUNTER — HOSPITAL ENCOUNTER (OUTPATIENT)
Dept: CARDIOLOGY | Facility: HOSPITAL | Age: 70
Discharge: HOME | End: 2020-05-14
Admitting: INTERNAL MEDICINE

## 2020-05-14 DIAGNOSIS — I82.411 ACUTE DEEP VEIN THROMBOSIS (DVT) OF FEMORAL VEIN OF RIGHT LOWER EXTREMITY (HCC): ICD-10-CM

## 2020-05-14 LAB
BH CV LOW VAS RIGHT POSTERIOR TIBIAL VESSEL: 1
BH CV LOWER VASCULAR LEFT COMMON FEMORAL AUGMENT: NORMAL
BH CV LOWER VASCULAR LEFT COMMON FEMORAL COMPETENT: NORMAL
BH CV LOWER VASCULAR LEFT COMMON FEMORAL COMPRESS: NORMAL
BH CV LOWER VASCULAR LEFT COMMON FEMORAL PHASIC: NORMAL
BH CV LOWER VASCULAR LEFT COMMON FEMORAL SPONT: NORMAL
BH CV LOWER VASCULAR RIGHT COMMON FEMORAL AUGMENT: NORMAL
BH CV LOWER VASCULAR RIGHT COMMON FEMORAL COMPETENT: NORMAL
BH CV LOWER VASCULAR RIGHT COMMON FEMORAL COMPRESS: NORMAL
BH CV LOWER VASCULAR RIGHT COMMON FEMORAL PHASIC: NORMAL
BH CV LOWER VASCULAR RIGHT COMMON FEMORAL SPONT: NORMAL
BH CV LOWER VASCULAR RIGHT DISTAL FEMORAL COMPRESS: NORMAL
BH CV LOWER VASCULAR RIGHT GASTRONEMIUS COMPRESS: NORMAL
BH CV LOWER VASCULAR RIGHT GREATER SAPH AK COMPRESS: NORMAL
BH CV LOWER VASCULAR RIGHT GREATER SAPH BK COMPRESS: NORMAL
BH CV LOWER VASCULAR RIGHT LESSER SAPH COMPRESS: NORMAL
BH CV LOWER VASCULAR RIGHT MID FEMORAL AUGMENT: NORMAL
BH CV LOWER VASCULAR RIGHT MID FEMORAL COMPETENT: NORMAL
BH CV LOWER VASCULAR RIGHT MID FEMORAL COMPRESS: NORMAL
BH CV LOWER VASCULAR RIGHT MID FEMORAL PHASIC: NORMAL
BH CV LOWER VASCULAR RIGHT MID FEMORAL SPONT: NORMAL
BH CV LOWER VASCULAR RIGHT PERONEAL COMPRESS: NORMAL
BH CV LOWER VASCULAR RIGHT POPLITEAL AUGMENT: NORMAL
BH CV LOWER VASCULAR RIGHT POPLITEAL COMPETENT: NORMAL
BH CV LOWER VASCULAR RIGHT POPLITEAL COMPRESS: NORMAL
BH CV LOWER VASCULAR RIGHT POPLITEAL PHASIC: NORMAL
BH CV LOWER VASCULAR RIGHT POPLITEAL SPONT: NORMAL
BH CV LOWER VASCULAR RIGHT POSTERIOR TIBIAL AUGMENT: NORMAL
BH CV LOWER VASCULAR RIGHT POSTERIOR TIBIAL COMPRESS: NORMAL
BH CV LOWER VASCULAR RIGHT POSTERIOR TIBIAL THROMBUS: NORMAL
BH CV LOWER VASCULAR RIGHT PROXIMAL FEMORAL COMPRESS: NORMAL
BH CV LOWER VASCULAR RIGHT SAPHENOFEMORAL JUNCTION COMPRESS: NORMAL

## 2020-05-14 PROCEDURE — 93971 EXTREMITY STUDY: CPT

## 2020-05-18 ENCOUNTER — APPOINTMENT (OUTPATIENT)
Dept: CARDIOLOGY | Facility: HOSPITAL | Age: 70
End: 2020-05-18

## 2020-05-21 ENCOUNTER — APPOINTMENT (OUTPATIENT)
Dept: LAB | Facility: HOSPITAL | Age: 70
End: 2020-05-21

## 2020-05-26 ENCOUNTER — OFFICE VISIT (OUTPATIENT)
Dept: ONCOLOGY | Facility: CLINIC | Age: 70
End: 2020-05-26

## 2020-05-26 ENCOUNTER — LAB (OUTPATIENT)
Dept: LAB | Facility: HOSPITAL | Age: 70
End: 2020-05-26

## 2020-05-26 VITALS
RESPIRATION RATE: 16 BRPM | WEIGHT: 210.6 LBS | SYSTOLIC BLOOD PRESSURE: 147 MMHG | BODY MASS INDEX: 29.48 KG/M2 | DIASTOLIC BLOOD PRESSURE: 79 MMHG | OXYGEN SATURATION: 95 % | TEMPERATURE: 97.3 F | HEIGHT: 71 IN | HEART RATE: 72 BPM

## 2020-05-26 DIAGNOSIS — I26.99 BILATERAL PULMONARY EMBOLISM (HCC): ICD-10-CM

## 2020-05-26 DIAGNOSIS — M79.652 LEFT THIGH PAIN: ICD-10-CM

## 2020-05-26 DIAGNOSIS — I82.411 ACUTE DEEP VEIN THROMBOSIS (DVT) OF FEMORAL VEIN OF RIGHT LOWER EXTREMITY (HCC): ICD-10-CM

## 2020-05-26 DIAGNOSIS — I26.99 BILATERAL PULMONARY EMBOLISM (HCC): Primary | ICD-10-CM

## 2020-05-26 DIAGNOSIS — Z79.01 CHRONIC ANTICOAGULATION: ICD-10-CM

## 2020-05-26 DIAGNOSIS — I82.551 CHRONIC DEEP VEIN THROMBOSIS (DVT) OF RIGHT PERONEAL VEIN (HCC): ICD-10-CM

## 2020-05-26 DIAGNOSIS — R76.0 LUPUS ANTICOAGULANT POSITIVE: ICD-10-CM

## 2020-05-26 PROBLEM — I82.511 CHRONIC DEEP VEIN THROMBOSIS (DVT) OF FEMORAL VEIN OF RIGHT LOWER EXTREMITY: Status: ACTIVE | Noted: 2020-01-07

## 2020-05-26 LAB
ALBUMIN SERPL-MCNC: 4.3 G/DL (ref 3.5–5.2)
ALBUMIN/GLOB SERPL: 1.5 G/DL (ref 1.1–2.4)
ALP SERPL-CCNC: 65 U/L (ref 38–116)
ALT SERPL W P-5'-P-CCNC: 34 U/L (ref 0–41)
ANION GAP SERPL CALCULATED.3IONS-SCNC: 11.4 MMOL/L (ref 5–15)
AST SERPL-CCNC: 22 U/L (ref 0–40)
BASOPHILS # BLD AUTO: 0.05 10*3/MM3 (ref 0–0.2)
BASOPHILS NFR BLD AUTO: 0.6 % (ref 0–1.5)
BILIRUB SERPL-MCNC: 0.3 MG/DL (ref 0.2–1.2)
BUN BLD-MCNC: 17 MG/DL (ref 6–20)
BUN/CREAT SERPL: 13.4 (ref 7.3–30)
CALCIUM SPEC-SCNC: 9.5 MG/DL (ref 8.5–10.2)
CHLORIDE SERPL-SCNC: 102 MMOL/L (ref 98–107)
CO2 SERPL-SCNC: 27.6 MMOL/L (ref 22–29)
CREAT BLD-MCNC: 1.27 MG/DL (ref 0.7–1.3)
DEPRECATED RDW RBC AUTO: 46.3 FL (ref 37–54)
EOSINOPHIL # BLD AUTO: 0.25 10*3/MM3 (ref 0–0.4)
EOSINOPHIL NFR BLD AUTO: 2.9 % (ref 0.3–6.2)
ERYTHROCYTE [DISTWIDTH] IN BLOOD BY AUTOMATED COUNT: 13.6 % (ref 12.3–15.4)
GFR SERPL CREATININE-BSD FRML MDRD: 56 ML/MIN/1.73
GLOBULIN UR ELPH-MCNC: 2.9 GM/DL (ref 1.8–3.5)
GLUCOSE BLD-MCNC: 105 MG/DL (ref 74–124)
HCT VFR BLD AUTO: 46.2 % (ref 37.5–51)
HGB BLD-MCNC: 14.8 G/DL (ref 13–17.7)
IMM GRANULOCYTES # BLD AUTO: 0.02 10*3/MM3 (ref 0–0.05)
IMM GRANULOCYTES NFR BLD AUTO: 0.2 % (ref 0–0.5)
LYMPHOCYTES # BLD AUTO: 2.53 10*3/MM3 (ref 0.7–3.1)
LYMPHOCYTES NFR BLD AUTO: 29.8 % (ref 19.6–45.3)
MCH RBC QN AUTO: 29.2 PG (ref 26.6–33)
MCHC RBC AUTO-ENTMCNC: 32 G/DL (ref 31.5–35.7)
MCV RBC AUTO: 91.3 FL (ref 79–97)
MONOCYTES # BLD AUTO: 0.7 10*3/MM3 (ref 0.1–0.9)
MONOCYTES NFR BLD AUTO: 8.2 % (ref 5–12)
NEUTROPHILS # BLD AUTO: 4.94 10*3/MM3 (ref 1.7–7)
NEUTROPHILS NFR BLD AUTO: 58.3 % (ref 42.7–76)
NRBC BLD AUTO-RTO: 0 /100 WBC (ref 0–0.2)
PLATELET # BLD AUTO: 275 10*3/MM3 (ref 140–450)
PMV BLD AUTO: 9.3 FL (ref 6–12)
POTASSIUM BLD-SCNC: 4.2 MMOL/L (ref 3.5–4.7)
PROT SERPL-MCNC: 7.2 G/DL (ref 6.3–8)
RBC # BLD AUTO: 5.06 10*6/MM3 (ref 4.14–5.8)
SODIUM BLD-SCNC: 141 MMOL/L (ref 134–145)
WBC NRBC COR # BLD: 8.49 10*3/MM3 (ref 3.4–10.8)

## 2020-05-26 PROCEDURE — 36415 COLL VENOUS BLD VENIPUNCTURE: CPT

## 2020-05-26 PROCEDURE — 99214 OFFICE O/P EST MOD 30 MIN: CPT | Performed by: INTERNAL MEDICINE

## 2020-05-26 PROCEDURE — 80053 COMPREHEN METABOLIC PANEL: CPT

## 2020-05-26 PROCEDURE — 85025 COMPLETE CBC W/AUTO DIFF WBC: CPT

## 2020-05-26 NOTE — PROGRESS NOTES
Subjective     CHIEF COMPLAINT:      Chief Complaint   Patient presents with   • Follow-up     discuss pain lt leg       HISTORY OF PRESENT ILLNESS:     Jerad Torres is a 69 y.o. male patient who returns today for follow up on his right lower extremity deep vein thrombosis.  He is on Xarelto 20 mg daily. He is tolerating it without problem with pain or swelling in the right leg.     Patient reports developing pain in the left thigh that started 1 week ago. He reports having strenuous exercise the days that preceded the onset of the pain. He repots long standing history of back pain.       REVIEW OF SYSTEMS:  Review of Systems   Constitutional: Negative for fatigue, fever and unexpected weight change.   HENT: Negative for nosebleeds and voice change.    Eyes: Negative for visual disturbance.   Respiratory: Negative for cough and shortness of breath.    Cardiovascular: Negative for chest pain and leg swelling.   Gastrointestinal: Negative for abdominal pain, blood in stool, constipation, diarrhea, nausea and vomiting.   Genitourinary: Negative for frequency and hematuria.   Musculoskeletal: Negative for back pain and joint swelling.   Skin: Negative for rash.   Neurological: Negative for dizziness and headaches.   Hematological: Negative for adenopathy. Does not bruise/bleed easily.   Psychiatric/Behavioral: Negative for dysphoric mood. The patient is not nervous/anxious.      Past Medical History:   Diagnosis Date   • Allergic rhinitis    • Hyperlipidemia    • MICHAEL (obstructive sleep apnea)    • Pulmonary embolism (CMS/HCC) 10/2019    RLE       Past Surgical History:   Procedure Laterality Date   • BACK SURGERY     • NEPHROLITHOTOMY         Cancer-related family history includes Colon cancer (age of onset: 70) in his father.  Social History     Tobacco Use   • Smoking status: Never Smoker   • Smokeless tobacco: Never Used   Substance Use Topics   • Alcohol use: Never     Frequency: Never       MEDICATIONS:    Current  "Outpatient Medications:   •  loratadine (CLARITIN) 10 MG tablet, Take 10 mg by mouth As Needed., Disp: , Rfl:   •  montelukast (SINGULAIR) 10 MG tablet, TAKE ONE TABLET BY MOUTH ONCE A DAY IN THE EVENING, Disp: , Rfl:   •  XARELTO 20 MG tablet, , Disp: , Rfl:   •  predniSONE (DELTASONE) 50 MG tablet, Take prednisone 50 mg by mouth 13 hours, 7 hours and 1 hour prior to CT scan., Disp: 3 tablet, Rfl: 0    ALLERGIES:  Allergies   Allergen Reactions   • Contrast Dye Itching   • Sulfa Antibiotics Nausea Only         Objective   VITAL SIGNS:     Vitals:    05/26/20 1629   BP: 147/79   Pulse: 72   Resp: 16   Temp: 97.3 °F (36.3 °C)   TempSrc: Temporal   SpO2: 95%   Weight: 95.5 kg (210 lb 9.6 oz)   Height: 180 cm (70.87\")   PainSc:   5   PainLoc: Leg     Body mass index is 29.48 kg/m².     Wt Readings from Last 3 Encounters:   05/26/20 95.5 kg (210 lb 9.6 oz)   01/14/20 98.7 kg (217 lb 8 oz)   01/07/20 96.3 kg (212 lb 3.2 oz)       PHYSICAL EXAMINATION:  GENERAL:  The patient appears in good general condition, not in acute distress.  SKIN: No skin rashes. No ecchymosis.  HEAD:  Normocephalic.  EYES:  No Jaundice. No Pallor. Pupils equal. EOMI.  NECK:  Supple with Good ROM.  CHEST: Normal respiratory effort.   CARDIAC:  No edema.  ABDOMEN:  Non-distended.  EXTREMITIES: no size difference between the right and left legs. No erythema or warmth. No thigh or calf calf tenderness.  NEUROLOGICAL:  No Focal neurological deficits.       DIAGNOSTIC DATA:     Results from last 7 days   Lab Units 05/26/20  1618   WBC 10*3/mm3 8.49   NEUTROS ABS 10*3/mm3 4.94   HEMOGLOBIN g/dL 14.8   HEMATOCRIT % 46.2   PLATELETS 10*3/mm3 275     Results from last 7 days   Lab Units 05/26/20  1618   SODIUM mmol/L 141   POTASSIUM mmol/L 4.2   CHLORIDE mmol/L 102   CO2 mmol/L 27.6   BUN mg/dL 17   CREATININE mg/dL 1.27   CALCIUM mg/dL 9.5   ALBUMIN g/dL 4.30   BILIRUBIN mg/dL 0.3   ALK PHOS U/L 65   ALT (SGPT) U/L 34   AST (SGOT) U/L 22   GLUCOSE mg/dL " 105     Component      Latest Ref Rng & Units 10/24/2019 1/7/2020 1/7/2020            3:56 PM  3:56 PM   Anti-Phosphatidyl Inositol       Comment  Comment   Anti-Phosphatidyl,IgA      <12.0 U/mL 4.6  3.1   Anti-Phosphatidyl,IgG      <12.0 U/mL 3.0  2.8   Anti-Phosphatidyl,IgM      <12.0 U/mL 3.0  1.2   Anti-Phosphatidic Acid       Comment  Comment   Anti-Phosphatidic,IgA      <12.0 U/mL 11.0  7.4   Anti-Phosphatidic,IgG      <12.0 U/mL 6.7  3.6   Anti-Phosphatidic,IgM      <12.0 U/mL 7.2  2.1   Anti-Phosphatidylethanolamine       Comment  Comment   Anti-Phosphatidylethanolamine, IgA       3.2  4.3   Anti-Phosphatidylethanolamine, IgG       33.3 (H)  18.3 (H)   Anti-Phosphatidylethanolamine, IgM       2.4  0.8   Anti-Phosphatidyl Glycerol       Comment  Comment   Anti-Phosphatidyl Glycerol, IgA      <12.0 U/mL 7.3  4.1   Anti-Phosphatidyl Glycerol, IgG      <12.0 U/mL 10.1  7.3   Anti-Phosphatidyl Glycerol, IgM      <12.0 U/mL 5.4  3.0   Anticardiolipin IgG      0 - 14 GPL U/mL <9 <9    Anticardiolipin IgM      0 - 12 MPL U/mL 9 <9    Anticardiolipin IgA      0 - 11 APL U/mL <9 <9    Beta-2 Glyco 1 IgG      0 - 20 GPI IgG units 31 (H) 39 (H)    Beta-2 Glyco 1 IgA      0 - 25 GPI IgA units <9 <9    Beta-2 Glyco 1 IgM      0 - 32 GPI IgM units <9 <9    Antiphosphatidylserine IgM      0 - 25 MPS IgM  23    Antiphosphatidylserine IgG      0 - 11 GPS IgG  7        Venous Doppler right lower extremity on 5/14/2020:  · Chronic, partially occlusive DVT in 1 of 2 paired right tibial peroneal trunks.  · All other right sided veins appeared normal.    Assessment/Plan   1.  Right lower extremity deep vein thrombosis and bilateral pulmonary embolism diagnosed on 10/8/2019.    · The CT scan at [facility] reported bilateral extensive pulmonary embolism.    · The largest was in the right main pulmonary artery into the lobar arteries.    · There was suspected infarction in the left lung base.  · Venous doppler showed  acute right lower extremity deep vein thrombosis in the femoral, popliteal and gastrocnemius veins.   · The DVT and PE developed after driving by car to [place].    · Patient was placed on IV heparin and discharged home on Xarelto.  · Venous doppler on 1/7/2020 showed the thrombosis to have become subacute.  · CT scan of the chest on 1/9/2020 showed complete resolution of the pulmonary embolism.   · Follow up venous doppler on 5/14/2020 showed significant improvement in the DVT. The residual was a non- occlusive chronic thrombus in the right tibial-peroneal trunk.   · He is no longer having symptoms in the right lower extremity.     2.  Positive test for lupus anticoagulant, anti-phosphatidyl ethanolamine IgG antibody and anti-beta-2 glycoprotein IgG antibody on 10/24/2019.   · The positive lupus anticoagulant may have been a false positive test due to Xarelto.  · the other 2 positive antibodies are concerning for an underlying antiphospholipid syndrome. However, only anti beta-2 glycoprotein antibody is considered clinically significant for a diagnosis of antiphospholipid syndrome.   · The patient's family history is positive for DVT in a sister who had a negative thrombophilia testing.   · Repeat testing on 1/7/2020 showed that the anti beta-2 glycoprotein was still positive. The titer was slightly higher. Anticardiolipin antibody was negative.  · Since the patient is responding well to Xarelto and his PE has resolved, I recommended continuing Xarelto.  · Repeat antiphospholipid antibody testing showed persistence of B2 glycoprotein IgG antibody. Anticardiolipin antibody test was negative.  · Antiphosphatidyl ethanolamine antibody (which is not considered clinically significant) decreased from 33 to 18 U/mL.  · I recommended continuing Xarelto.      3.  Pain in the left thigh that started 1 week ago. Exam today did not reveal changes of deep vein thrombosis.  The patient has low back pain and the left thigh  pain is likely a referred pain vs a pain that he developed after strenuous exercise he did over the past week.     PLAN:    1.  Continue Xarelto 20 mg daily.  2.  We will see him in follow up in 6 months with a CBC, CMP, anticardiolipin and B2 glycoprotein antibodies 1 week prior.  3.  The patient will need anticoagulation indefinitely. We will make the decision at his return visit about reducing the dose of Xarelto to the prophylactic dose of 10 mg daily.         Kat Umaña MD  05/26/20

## 2020-05-27 ENCOUNTER — TELEPHONE (OUTPATIENT)
Dept: ONCOLOGY | Facility: CLINIC | Age: 70
End: 2020-05-27

## 2020-05-27 NOTE — TELEPHONE ENCOUNTER
I contacted the patient.  See note below.  Pt v/u.    ----- Message from Kat Umaña MD sent at 5/26/2020  9:52 PM EDT -----  Please inform patient that his kidney function decreased from before. Please ask him to drink more fluids.    Thank you

## 2020-11-03 ENCOUNTER — LAB (OUTPATIENT)
Dept: LAB | Facility: HOSPITAL | Age: 70
End: 2020-11-03

## 2020-11-03 DIAGNOSIS — I26.99 BILATERAL PULMONARY EMBOLISM (HCC): ICD-10-CM

## 2020-11-03 DIAGNOSIS — Z79.01 CHRONIC ANTICOAGULATION: ICD-10-CM

## 2020-11-03 DIAGNOSIS — R76.0 LUPUS ANTICOAGULANT POSITIVE: ICD-10-CM

## 2020-11-03 LAB
ALBUMIN SERPL-MCNC: 4.4 G/DL (ref 3.5–5.2)
ALBUMIN/GLOB SERPL: 1.6 G/DL (ref 1.1–2.4)
ALP SERPL-CCNC: 65 U/L (ref 38–116)
ALT SERPL W P-5'-P-CCNC: 19 U/L (ref 0–41)
ANION GAP SERPL CALCULATED.3IONS-SCNC: 10.6 MMOL/L (ref 5–15)
AST SERPL-CCNC: 19 U/L (ref 0–40)
BASOPHILS # BLD AUTO: 0.05 10*3/MM3 (ref 0–0.2)
BASOPHILS NFR BLD AUTO: 0.6 % (ref 0–1.5)
BILIRUB SERPL-MCNC: 0.4 MG/DL (ref 0.2–1.2)
BUN SERPL-MCNC: 13 MG/DL (ref 6–20)
BUN/CREAT SERPL: 11.1 (ref 7.3–30)
CALCIUM SPEC-SCNC: 9.5 MG/DL (ref 8.5–10.2)
CHLORIDE SERPL-SCNC: 102 MMOL/L (ref 98–107)
CO2 SERPL-SCNC: 27.4 MMOL/L (ref 22–29)
CREAT SERPL-MCNC: 1.17 MG/DL (ref 0.7–1.3)
DEPRECATED RDW RBC AUTO: 45.3 FL (ref 37–54)
EOSINOPHIL # BLD AUTO: 0.17 10*3/MM3 (ref 0–0.4)
EOSINOPHIL NFR BLD AUTO: 2.1 % (ref 0.3–6.2)
ERYTHROCYTE [DISTWIDTH] IN BLOOD BY AUTOMATED COUNT: 13.4 % (ref 12.3–15.4)
GFR SERPL CREATININE-BSD FRML MDRD: 62 ML/MIN/1.73
GLOBULIN UR ELPH-MCNC: 2.7 GM/DL (ref 1.8–3.5)
GLUCOSE SERPL-MCNC: 99 MG/DL (ref 74–124)
HCT VFR BLD AUTO: 46.3 % (ref 37.5–51)
HGB BLD-MCNC: 14.9 G/DL (ref 13–17.7)
IMM GRANULOCYTES # BLD AUTO: 0.03 10*3/MM3 (ref 0–0.05)
IMM GRANULOCYTES NFR BLD AUTO: 0.4 % (ref 0–0.5)
LYMPHOCYTES # BLD AUTO: 2.58 10*3/MM3 (ref 0.7–3.1)
LYMPHOCYTES NFR BLD AUTO: 32 % (ref 19.6–45.3)
MCH RBC QN AUTO: 29.4 PG (ref 26.6–33)
MCHC RBC AUTO-ENTMCNC: 32.2 G/DL (ref 31.5–35.7)
MCV RBC AUTO: 91.3 FL (ref 79–97)
MONOCYTES # BLD AUTO: 0.54 10*3/MM3 (ref 0.1–0.9)
MONOCYTES NFR BLD AUTO: 6.7 % (ref 5–12)
NEUTROPHILS NFR BLD AUTO: 4.7 10*3/MM3 (ref 1.7–7)
NEUTROPHILS NFR BLD AUTO: 58.2 % (ref 42.7–76)
NRBC BLD AUTO-RTO: 0 /100 WBC (ref 0–0.2)
PLATELET # BLD AUTO: 289 10*3/MM3 (ref 140–450)
PMV BLD AUTO: 9.2 FL (ref 6–12)
POTASSIUM SERPL-SCNC: 4.1 MMOL/L (ref 3.5–4.7)
PROT SERPL-MCNC: 7.1 G/DL (ref 6.3–8)
RBC # BLD AUTO: 5.07 10*6/MM3 (ref 4.14–5.8)
SODIUM SERPL-SCNC: 140 MMOL/L (ref 134–145)
WBC # BLD AUTO: 8.07 10*3/MM3 (ref 3.4–10.8)

## 2020-11-03 PROCEDURE — 80053 COMPREHEN METABOLIC PANEL: CPT

## 2020-11-03 PROCEDURE — 36415 COLL VENOUS BLD VENIPUNCTURE: CPT

## 2020-11-03 PROCEDURE — 85025 COMPLETE CBC W/AUTO DIFF WBC: CPT

## 2020-11-04 LAB
CARDIOLIPIN IGA SER IA-ACNC: <9 APL U/ML (ref 0–11)
CARDIOLIPIN IGG SER IA-ACNC: <9 GPL U/ML (ref 0–14)
CARDIOLIPIN IGM SER IA-ACNC: 10 MPL U/ML (ref 0–12)

## 2020-11-05 LAB
B2 GLYCOPROT1 IGA SER-ACNC: <9 GPI IGA UNITS (ref 0–25)
B2 GLYCOPROT1 IGG SER-ACNC: <9 GPI IGG UNITS (ref 0–20)
B2 GLYCOPROT1 IGM SER-ACNC: <9 GPI IGM UNITS (ref 0–32)

## 2020-11-10 ENCOUNTER — OFFICE VISIT (OUTPATIENT)
Dept: ONCOLOGY | Facility: CLINIC | Age: 70
End: 2020-11-10

## 2020-11-10 ENCOUNTER — APPOINTMENT (OUTPATIENT)
Dept: LAB | Facility: HOSPITAL | Age: 70
End: 2020-11-10

## 2020-11-10 VITALS
BODY MASS INDEX: 29.97 KG/M2 | HEART RATE: 83 BPM | SYSTOLIC BLOOD PRESSURE: 171 MMHG | TEMPERATURE: 97.7 F | DIASTOLIC BLOOD PRESSURE: 77 MMHG | WEIGHT: 214.1 LBS | OXYGEN SATURATION: 96 % | RESPIRATION RATE: 16 BRPM | HEIGHT: 71 IN

## 2020-11-10 DIAGNOSIS — R76.0 LUPUS ANTICOAGULANT POSITIVE: ICD-10-CM

## 2020-11-10 DIAGNOSIS — R42 POSTURAL DIZZINESS: ICD-10-CM

## 2020-11-10 DIAGNOSIS — I26.99 BILATERAL PULMONARY EMBOLISM (HCC): ICD-10-CM

## 2020-11-10 DIAGNOSIS — I82.551 CHRONIC DEEP VEIN THROMBOSIS (DVT) OF RIGHT PERONEAL VEIN (HCC): Primary | ICD-10-CM

## 2020-11-10 DIAGNOSIS — Z79.01 CHRONIC ANTICOAGULATION: ICD-10-CM

## 2020-11-10 PROCEDURE — 99214 OFFICE O/P EST MOD 30 MIN: CPT | Performed by: INTERNAL MEDICINE

## 2020-11-10 NOTE — PROGRESS NOTES
Subjective     CHIEF COMPLAINT:      Chief Complaint   Patient presents with   • Follow-up     discuss b/p and convid testing       HISTORY OF PRESENT ILLNESS:     Jerad Torres is a 70 y.o. male patient who returns today for follow up on his right lower extremity deep vein thrombosis. He is on Xarelto 20 mg daily. He is tolerating it without problem with bleeding. He is using the compression stocking regularly. He is not having problem with swelling.     Patient reports episodes of dizziness when he changes position. No dizziness in the sitting position.         REVIEW OF SYSTEMS:  Review of Systems   Constitutional: Negative for fatigue, fever and unexpected weight change.   HENT: Negative for nosebleeds and voice change.    Eyes: Negative for visual disturbance.   Respiratory: Negative for cough and shortness of breath.    Cardiovascular: Negative for chest pain and leg swelling.   Gastrointestinal: Negative for abdominal pain, blood in stool, constipation, diarrhea, nausea and vomiting.   Genitourinary: Negative for frequency and hematuria.   Musculoskeletal: Negative for back pain and joint swelling.   Skin: Negative for rash.   Neurological: Positive for dizziness. Negative for headaches.   Hematological: Negative for adenopathy. Does not bruise/bleed easily.   Psychiatric/Behavioral: Negative for dysphoric mood. The patient is not nervous/anxious.      I reviewed and verified the CC and ROS obtained by the MA.     Past Medical History:   Diagnosis Date   • Allergic rhinitis    • Hyperlipidemia    • MICHAEL (obstructive sleep apnea)    • Pulmonary embolism (CMS/HCC) 10/2019    RLE       Past Surgical History:   Procedure Laterality Date   • BACK SURGERY     • NEPHROLITHOTOMY         Cancer-related family history includes Colon cancer (age of onset: 70) in his father.  Social History     Tobacco Use   • Smoking status: Never Smoker   • Smokeless tobacco: Never Used   Substance Use Topics   • Alcohol use: Never      "Frequency: Never       MEDICATIONS:    Current Outpatient Medications:   •  loratadine (CLARITIN) 10 MG tablet, Take 10 mg by mouth As Needed., Disp: , Rfl:   •  rivaroxaban (Xarelto) 20 MG tablet, Take 1 tablet by mouth Daily With Dinner., Disp: 30 tablet, Rfl: 5  •  predniSONE (DELTASONE) 50 MG tablet, Take prednisone 50 mg by mouth 13 hours, 7 hours and 1 hour prior to CT scan., Disp: 3 tablet, Rfl: 0    ALLERGIES:  Allergies   Allergen Reactions   • Contrast Dye Itching   • Sulfa Antibiotics Nausea Only         Objective   VITAL SIGNS:     Vitals:    11/10/20 1603   BP: 171/77   Pulse: 83   Resp: 16   Temp: 97.7 °F (36.5 °C)   TempSrc: Temporal   SpO2: 96%   Weight: 97.1 kg (214 lb 1.6 oz)   Height: 180 cm (70.87\")   PainSc: 0-No pain     Body mass index is 29.97 kg/m².     Wt Readings from Last 3 Encounters:   11/10/20 97.1 kg (214 lb 1.6 oz)   05/26/20 95.5 kg (210 lb 9.6 oz)   01/14/20 98.7 kg (217 lb 8 oz)       PHYSICAL EXAMINATION:  GENERAL:  The patient appears in good general condition, not in acute distress.  SKIN: No skin rashes. No ecchymosis.  HEAD:  Normocephalic.  EYES:  No Jaundice. No Pallor. Pupils equal. EOMI.  NECK:  Supple with Good ROM. No Thyromegaly. No Masses.  LYMPHATICS:  No cervical or supraclavicular lymphadenopathy.  CHEST: Normal respiratory effort.    CARDIAC:  No edema.  ABDOMEN:  Non-distended.  EXTREMITIES: no size difference between the right and left legs. No varicose veins. No Calf tenderness.  NEUROLOGICAL:  No Focal neurological deficits.       DIAGNOSTIC DATA:     Component      Latest Ref Rng & Units 11/3/2020   WBC      3.40 - 10.80 10*3/mm3 8.07   RBC      4.14 - 5.80 10*6/mm3 5.07   Hemoglobin      13.0 - 17.7 g/dL 14.9   Hematocrit      37.5 - 51.0 % 46.3   MCV      79.0 - 97.0 fL 91.3   MCH      26.6 - 33.0 pg 29.4   MCHC      31.5 - 35.7 g/dL 32.2   RDW      12.3 - 15.4 % 13.4   RDW-SD      37.0 - 54.0 fl 45.3   MPV      6.0 - 12.0 fL 9.2   Platelets      140 - 450 " 10*3/mm3 289   Neutrophil Rel %      42.7 - 76.0 % 58.2   Lymphocyte Rel %      19.6 - 45.3 % 32.0   Monocyte Rel %      5.0 - 12.0 % 6.7   Eosinophil Rel %      0.3 - 6.2 % 2.1   Basophil Rel %      0.0 - 1.5 % 0.6   Immature Granulocyte Rel %      0.0 - 0.5 % 0.4   Neutrophils Absolute      1.70 - 7.00 10*3/mm3 4.70   Lymphocytes Absolute      0.70 - 3.10 10*3/mm3 2.58   Monocytes Absolute      0.10 - 0.90 10*3/mm3 0.54   Eosinophils Absolute      0.00 - 0.40 10*3/mm3 0.17   Basophils Absolute      0.00 - 0.20 10*3/mm3 0.05   Immature Grans, Absolute      0.00 - 0.05 10*3/mm3 0.03   nRBC      0.0 - 0.2 /100 WBC 0.0   Glucose      74 - 124 mg/dL 99   BUN      6 - 20 mg/dL 13   Creatinine      0.70 - 1.30 mg/dL 1.17   Sodium      134 - 145 mmol/L 140   Potassium      3.5 - 4.7 mmol/L 4.1   Chloride      98 - 107 mmol/L 102   CO2      22.0 - 29.0 mmol/L 27.4   Calcium      8.5 - 10.2 mg/dL 9.5   Total Protein      6.3 - 8.0 g/dL 7.1   Albumin      3.50 - 5.20 g/dL 4.40   ALT (SGPT)      0 - 41 U/L 19   AST (SGOT)      0 - 40 U/L 19   Alkaline Phosphatase      38 - 116 U/L 65   Total Bilirubin      0.2 - 1.2 mg/dL 0.4   eGFR Non African Am      >60 mL/min/1.73 62   Globulin      1.8 - 3.5 gm/dL 2.7   A/G Ratio      1.1 - 2.4 g/dL 1.6   BUN/Creatinine Ratio      7.3 - 30.0 11.1   Anion Gap      5.0 - 15.0 mmol/L 10.6       Component      Latest Ref Rng & Units 10/24/2019 1/7/2020 11/3/2020   Anticardiolipin IgG      0 - 14 GPL U/mL <9 <9 <9   Anticardiolipin IgM      0 - 12 MPL U/mL 9 <9 10   Anticardiolipin IgA      0 - 11 APL U/mL <9 <9 <9   Beta-2 Glyco 1 IgG      0 - 20 GPI IgG units 31 (H) 39 (H) <9   Beta-2 Glyco 1 IgA      0 - 25 GPI IgA units <9 <9 <9   Beta-2 Glyco 1 IgM      0 - 32 GPI IgM units <9 <9 <9         Assessment/Plan   1.  Right lower extremity deep vein thrombosis and bilateral pulmonary embolism diagnosed on 10/8/2019.    · The CT scan at [facility] reported bilateral extensive  pulmonary embolism.    · The largest was in the right main pulmonary artery into the lobar arteries.    · There was suspected infarction in the left lung base.  · Venous doppler showed acute right lower extremity deep vein thrombosis in the femoral, popliteal and gastrocnemius veins.   · The DVT and PE developed after driving by car to [place].    · Patient was placed on IV heparin and discharged home on Xarelto.  · Venous doppler on 1/7/2020 showed the thrombosis to have become subacute.  · CT scan of the chest on 1/9/2020 showed complete resolution of the pulmonary embolism.   · Venous doppler on 5/14/2020 showed significant improvement in the DVT. The residual was a non- occlusive chronic thrombus in the right tibial-peroneal trunk.   · Patient is tolerating Xarelto well. He is not having problem with bleeding.   · Based on the small residual and the negative antiphospholipid antibodies on 11/3/2020, I recommended reducing Xarelto to a prophylactic dose when he was seen on 11/10/2020.     2.  Positive test for lupus anticoagulant, anti-phosphatidyl ethanolamine IgG antibody and anti-beta-2 glycoprotein IgG antibody on 10/24/2019.   · The positive lupus anticoagulant may have been a false positive test due to Xarelto.  · the other 2 positive antibodies are concerning for an underlying antiphospholipid syndrome. However, only anti beta-2 glycoprotein antibody is considered clinically significant for a diagnosis of antiphospholipid syndrome.   · The patient's family history is positive for DVT in a sister who had a negative thrombophilia testing.   · Repeat testing on 1/7/2020 showed that the anti beta-2 glycoprotein was still positive. The titer was slightly higher. Anticardiolipin antibody was negative.  · Since the patient is responding well to Xarelto and his PE has resolved, I recommended continuing Xarelto.  · Repeat antiphospholipid antibody testing showed persistence of B2 glycoprotein IgG antibody.  Anticardiolipin antibody test was negative.  · Antiphosphatidyl ethanolamine antibody (which is not considered clinically significant) decreased from 33 to 18 U/mL.  · Patient was continued on Xarelto 20 mg daily.  · Repeat B2 GP and ALAN on 11/3/2020 was negative.    3.  Elevated BP today at 171/77.  He reports episodes of dizziness likely due to changes in his BP with changing position.     PLAN:    1.  Reduce Xarelto to 10 mg a day and continue with this dose indefinitely as long as there is no contraindication.  2.  The patient does not need to use the compression stocking regularly. I recommended using them when he travels.   3.  I asked him to measure his BP daily while sitting and contact his PCP with the readings.  4.  I will see him in follow up in 1 year with a CBC and CMP. He will notify us sooner if there are any changes.         Kat Umaña MD  11/10/20

## 2020-11-13 ENCOUNTER — TELEPHONE (OUTPATIENT)
Dept: ONCOLOGY | Facility: CLINIC | Age: 70
End: 2020-11-13

## 2020-11-13 NOTE — TELEPHONE ENCOUNTER
Called and talked with patient.   Patient states he just wanted to let us know he tested positive for covid, pt was in the the office on 11/10/20 and saw Dr Umaña.  Discussed with Ranulfo FULTON and message forwarded to her.

## 2020-11-13 NOTE — TELEPHONE ENCOUNTER
Patient's wife, Sara, julia.  Patient was in the office on 11/10.  He started feeling badly on Wednesday morning-low grade fever and coughing).  He tested positive for COVID-19 yesterday evening.    [phone]

## 2021-10-11 RX ORDER — RIVAROXABAN 10 MG/1
TABLET, FILM COATED ORAL
Qty: 90 TABLET | Refills: 3 | Status: SHIPPED | OUTPATIENT
Start: 2021-10-11 | End: 2021-12-20 | Stop reason: DRUGHIGH

## 2021-10-11 NOTE — TELEPHONE ENCOUNTER
Xarelto 10 mg refill request rec electronically from [facility]. Per last office note from Dr Umaña-pt is to continue.    Reduce Xarelto to 10 mg a day and continue with this dose indefinitely as long as there is no contraindication.    I have routed the rx to Dr Umaña for signature. Once signed it will be escribed to [facility].

## 2021-11-11 ENCOUNTER — OFFICE VISIT (OUTPATIENT)
Dept: ONCOLOGY | Facility: CLINIC | Age: 71
End: 2021-11-11

## 2021-11-11 ENCOUNTER — LAB (OUTPATIENT)
Dept: LAB | Facility: HOSPITAL | Age: 71
End: 2021-11-11

## 2021-11-11 VITALS
BODY MASS INDEX: 25.67 KG/M2 | HEIGHT: 72 IN | RESPIRATION RATE: 16 BRPM | HEART RATE: 60 BPM | WEIGHT: 189.5 LBS | TEMPERATURE: 97.1 F | SYSTOLIC BLOOD PRESSURE: 127 MMHG | OXYGEN SATURATION: 99 % | DIASTOLIC BLOOD PRESSURE: 82 MMHG

## 2021-11-11 DIAGNOSIS — D64.9 NORMOCHROMIC NORMOCYTIC ANEMIA: ICD-10-CM

## 2021-11-11 DIAGNOSIS — R76.0 LUPUS ANTICOAGULANT POSITIVE: ICD-10-CM

## 2021-11-11 DIAGNOSIS — I26.99 BILATERAL PULMONARY EMBOLISM (HCC): Primary | ICD-10-CM

## 2021-11-11 DIAGNOSIS — I82.551 CHRONIC DEEP VEIN THROMBOSIS (DVT) OF RIGHT PERONEAL VEIN (HCC): ICD-10-CM

## 2021-11-11 DIAGNOSIS — Z79.01 CHRONIC ANTICOAGULATION: ICD-10-CM

## 2021-11-11 DIAGNOSIS — Z86.16 HISTORY OF COVID-19: ICD-10-CM

## 2021-11-11 LAB
ALBUMIN SERPL-MCNC: 4.1 G/DL (ref 3.5–5.2)
ALBUMIN/GLOB SERPL: 1.6 G/DL (ref 1.1–2.4)
ALP SERPL-CCNC: 67 U/L (ref 38–116)
ALT SERPL W P-5'-P-CCNC: 12 U/L (ref 0–41)
ANION GAP SERPL CALCULATED.3IONS-SCNC: 7.6 MMOL/L (ref 5–15)
AST SERPL-CCNC: 13 U/L (ref 0–40)
BASOPHILS # BLD AUTO: 0.05 10*3/MM3 (ref 0–0.2)
BASOPHILS NFR BLD AUTO: 0.8 % (ref 0–1.5)
BILIRUB SERPL-MCNC: 0.5 MG/DL (ref 0.2–1.2)
BUN SERPL-MCNC: 17 MG/DL (ref 6–20)
BUN/CREAT SERPL: 15.5 (ref 7.3–30)
CALCIUM SPEC-SCNC: 9.3 MG/DL (ref 8.5–10.2)
CHLORIDE SERPL-SCNC: 102 MMOL/L (ref 98–107)
CO2 SERPL-SCNC: 29.4 MMOL/L (ref 22–29)
CREAT SERPL-MCNC: 1.1 MG/DL (ref 0.7–1.3)
DEPRECATED RDW RBC AUTO: 47.9 FL (ref 37–54)
EOSINOPHIL # BLD AUTO: 0.13 10*3/MM3 (ref 0–0.4)
EOSINOPHIL NFR BLD AUTO: 2.1 % (ref 0.3–6.2)
ERYTHROCYTE [DISTWIDTH] IN BLOOD BY AUTOMATED COUNT: 14.1 % (ref 12.3–15.4)
FERRITIN SERPL-MCNC: 182.2 NG/ML (ref 30–400)
GFR SERPL CREATININE-BSD FRML MDRD: 66 ML/MIN/1.73
GLOBULIN UR ELPH-MCNC: 2.6 GM/DL (ref 1.8–3.5)
GLUCOSE SERPL-MCNC: 87 MG/DL (ref 74–124)
HCT VFR BLD AUTO: 43.2 % (ref 37.5–51)
HGB BLD-MCNC: 13.5 G/DL (ref 13–17.7)
IMM GRANULOCYTES # BLD AUTO: 0.01 10*3/MM3 (ref 0–0.05)
IMM GRANULOCYTES NFR BLD AUTO: 0.2 % (ref 0–0.5)
IRON 24H UR-MRATE: 88 MCG/DL (ref 59–158)
IRON SATN MFR SERPL: 30 % (ref 14–48)
LYMPHOCYTES # BLD AUTO: 1.99 10*3/MM3 (ref 0.7–3.1)
LYMPHOCYTES NFR BLD AUTO: 32.5 % (ref 19.6–45.3)
MCH RBC QN AUTO: 28.8 PG (ref 26.6–33)
MCHC RBC AUTO-ENTMCNC: 31.3 G/DL (ref 31.5–35.7)
MCV RBC AUTO: 92.1 FL (ref 79–97)
MONOCYTES # BLD AUTO: 0.5 10*3/MM3 (ref 0.1–0.9)
MONOCYTES NFR BLD AUTO: 8.2 % (ref 5–12)
NEUTROPHILS NFR BLD AUTO: 3.45 10*3/MM3 (ref 1.7–7)
NEUTROPHILS NFR BLD AUTO: 56.2 % (ref 42.7–76)
NRBC BLD AUTO-RTO: 0 /100 WBC (ref 0–0.2)
PLATELET # BLD AUTO: 273 10*3/MM3 (ref 140–450)
PMV BLD AUTO: 9.4 FL (ref 6–12)
POTASSIUM SERPL-SCNC: 4.8 MMOL/L (ref 3.5–4.7)
PROT SERPL-MCNC: 6.7 G/DL (ref 6.3–8)
RBC # BLD AUTO: 4.69 10*6/MM3 (ref 4.14–5.8)
SODIUM SERPL-SCNC: 139 MMOL/L (ref 134–145)
TIBC SERPL-MCNC: 297 MCG/DL (ref 249–505)
TRANSFERRIN SERPL-MCNC: 212 MG/DL (ref 200–360)
VIT B12 BLD-MCNC: 774 PG/ML (ref 211–946)
WBC # BLD AUTO: 6.13 10*3/MM3 (ref 3.4–10.8)

## 2021-11-11 PROCEDURE — 83540 ASSAY OF IRON: CPT | Performed by: INTERNAL MEDICINE

## 2021-11-11 PROCEDURE — 84466 ASSAY OF TRANSFERRIN: CPT | Performed by: INTERNAL MEDICINE

## 2021-11-11 PROCEDURE — 82728 ASSAY OF FERRITIN: CPT | Performed by: INTERNAL MEDICINE

## 2021-11-11 PROCEDURE — 80053 COMPREHEN METABOLIC PANEL: CPT

## 2021-11-11 PROCEDURE — 85025 COMPLETE CBC W/AUTO DIFF WBC: CPT

## 2021-11-11 PROCEDURE — 99214 OFFICE O/P EST MOD 30 MIN: CPT | Performed by: INTERNAL MEDICINE

## 2021-11-11 PROCEDURE — 36415 COLL VENOUS BLD VENIPUNCTURE: CPT

## 2021-11-11 PROCEDURE — 82607 VITAMIN B-12: CPT | Performed by: INTERNAL MEDICINE

## 2021-11-11 NOTE — PROGRESS NOTES
"Subjective     CHIEF COMPLAINT:      Chief Complaint   Patient presents with   • Annual Exam     discuss Covid antibodies       HISTORY OF PRESENT ILLNESS:     Jerad Torres is a 71 y.o. male patient who returns today for follow up on his deep vein thrombosis and pulmonary embolism.  He is on Xarelto 10 mg daily.  He is tolerating it well.  He is not having problems with bleeding or bruising.    Patient change his diet several months ago.  He is eating 1 large meal and eating plenty of vegetables.  He is eating Alena bars.  He increase his fluid intake significantly.  He was able to lose weight.  He is feeling better since he lost weight.    Patient developed Covid 19 infection about a year ago.  He had antibody test around February 2021 that was positive.  He did not receive the COVID-19 vaccine as he is concerned about the side effects, namely thrombosis.    Patient's son was found to have significant thrombocytosis and was diagnosed with essential thrombocythemia.    ROS:  Pertinent ROS is in the HPI.     Past medical, surgical, social and family history were reviewed.     MEDICATIONS:    Current Outpatient Medications:   •  loratadine (CLARITIN) 10 MG tablet, Take 10 mg by mouth As Needed., Disp: , Rfl:   •  Xarelto 10 MG tablet, TAKE 1 TABLET DAILY WITH DINNER, Disp: 90 tablet, Rfl: 3    Objective   VITAL SIGNS:     Vitals:    11/11/21 1302   BP: 127/82   Pulse: 60   Resp: 16   Temp: 97.1 °F (36.2 °C)   TempSrc: Temporal   SpO2: 99%   Weight: 86 kg (189 lb 8 oz)   Height: 183 cm (72.05\")  Comment: states   PainSc: 0-No pain     Body mass index is 25.67 kg/m².     Wt Readings from Last 5 Encounters:   11/11/21 86 kg (189 lb 8 oz)   11/10/20 97.1 kg (214 lb 1.6 oz)   05/26/20 95.5 kg (210 lb 9.6 oz)   01/14/20 98.7 kg (217 lb 8 oz)   01/07/20 96.3 kg (212 lb 3.2 oz)       PHYSICAL EXAMINATION:   GENERAL: The patient appears in good general condition, not in acute distress.   SKIN: No ecchymosis.  EYES: No " jaundice.  LYMPHATICS: No cervical lymphadenopathy.  CHEST: Normal respiratory effort.  Lungs clear bilaterally.  No added sounds.  CVS: Normal S1-S2.  No murmurs.  ABDOMEN: Soft. No tenderness. No Hepatomegaly. No Splenomegaly. No masses.  EXTREMITIES: No edema.  No calf tenderness.     DIAGNOSTIC DATA:     Results from last 7 days   Lab Units 11/11/21  1253   WBC 10*3/mm3 6.13   NEUTROS ABS 10*3/mm3 3.45   HEMOGLOBIN g/dL 13.5   HEMATOCRIT % 43.2   PLATELETS 10*3/mm3 273     Results from last 7 days   Lab Units 11/11/21  1253   SODIUM mmol/L 139   POTASSIUM mmol/L 4.8*   CHLORIDE mmol/L 102   CO2 mmol/L 29.4*   BUN mg/dL 17   CREATININE mg/dL 1.10   CALCIUM mg/dL 9.3   ALBUMIN g/dL 4.10   BILIRUBIN mg/dL 0.5   ALK PHOS U/L 67   ALT (SGPT) U/L 12   AST (SGOT) U/L 13   GLUCOSE mg/dL 87         Lab 11/11/21  1253   IRON 88   IRON SATURATION 30   TIBC 297   TRANSFERRIN 212   FERRITIN 182.20        Assessment/Plan   *Right lower extremity deep vein thrombosis and bilateral pulmonary embolism diagnosed on 10/8/2019.    · The CT scan at  reported bilateral extensive pulmonary embolism.    · The largest was in the right main pulmonary artery into the lobar arteries.    · There was suspected infarction in the left lung base.  · Venous doppler showed acute right lower extremity deep vein thrombosis in the femoral, popliteal and gastrocnemius veins.   · The DVT and PE developed after driving by car to [place].    · Patient was placed on IV heparin and discharged home on Xarelto.  · Venous doppler on 1/7/2020 showed the thrombosis to have become subacute.  · CT scan of the chest on 1/9/2020 showed complete resolution of the pulmonary embolism.   · Venous doppler on 5/14/2020 showed significant improvement in the DVT. There was a residual non- occlusive chronic thrombus in the right tibial-peroneal trunk.   · Xarelto was reduced to 10 mg daily on 11/10/2020.  · Patient is tolerating Xarelto  well.  No symptoms or signs of recurrent thrombosis.    *Positive test for lupus anticoagulant, anti-phosphatidyl ethanolamine IgG antibody and anti-beta-2 glycoprotein IgG antibody on 10/24/2019.   · The positive lupus anticoagulant was most likely a false positive test due to Xarelto.  · the other 2 positive antibodies are concerning for an underlying antiphospholipid syndrome. However, only anti beta-2 glycoprotein antibody is considered clinically significant for a diagnosis of antiphospholipid syndrome.   · The patient's family history is positive for DVT in a sister who had a negative thrombophilia testing.   · Repeat testing on 1/7/2020 showed that the anti beta-2 glycoprotein was still positive. The titer was slightly higher. Anticardiolipin antibody was negative.  · Since the patient was responding to Xarelto and the pulmonary embolism resolved, he was continued on Xarelto.  · Patient continues to have beta-2 glycoprotein IgG antibody on repeat testing. Anticardiolipin antibody was negative.  · Antiphosphatidyl ethanolamine antibody decreased from 33 to 18 U/mL.  · Beta-2 glycoprotein and anticardiolipin antibodies became negative on 11/3/2020.   · Therefore, Xarelto was considered to be a good therapeutic option for him.    *Normochromic normocytic anemia.    · Hemoglobin was 14.9 on 11/3/2020  · Hemoglobin decreased to 13.5.    · Anemia work-up revealed no evidence of iron deficiency.  · The decrease in hemoglobin may be attributed to dilution effect due to patient increasing fluid intake.    PLAN:    1.  Continue Xarelto 10 mg daily.  2.  I will obtain Covid antibody titer today.   3.  Recommended receiving the COVID-19 vaccine.  I recommended receiving the Pfizer vaccine.  4.  Obtain vitamin B12 level.  5.  I will see him in follow-up in 1 year with CBC and CMP.  He will notify us sooner if there are any changes.           Kat Umaña MD  11/11/21

## 2021-11-12 LAB — SARS-COV-2 AB SERPL QL IA: POSITIVE

## 2021-11-15 ENCOUNTER — TELEPHONE (OUTPATIENT)
Dept: ONCOLOGY | Facility: CLINIC | Age: 71
End: 2021-11-15

## 2021-11-15 NOTE — TELEPHONE ENCOUNTER
----- Message from Kat Umaña MD sent at 11/14/2021  9:30 PM EST -----  Please inform the patient that his Covid antibody test was positive. Labs showed no evidence of iron, vitamin B12 or folate deficiency.     Thank you

## 2021-12-20 ENCOUNTER — TELEPHONE (OUTPATIENT)
Dept: ONCOLOGY | Facility: CLINIC | Age: 71
End: 2021-12-20

## 2021-12-20 NOTE — TELEPHONE ENCOUNTER
Patient is in [place], and had to go to the emergency room and he has a new clot behind his right knee.  ER prescribed Xarelto 20 mg daily.  He went to get it filled and cost is 400.00, which is too expensive for them.  Needs new script sent to [facility] and can get the medication in about 3 days, they are doubling up his 10 mg tablets for now till the new prescription is filled.  They can afford the medication through their mail order pharmacy.  They were instructed to get out every 2 hours while driving back from [place] to walk around and he will need a new appointment with Dr. Umaña when he returns due to this finding.  Message sent to schedulers for appointment.

## 2021-12-20 NOTE — TELEPHONE ENCOUNTER
Pt is in [facility] and his leg started hurting and went to ER and he has clot behind his knee.  The ER wants to increase his Xarelto to 20 mg and he needs a refill in order to increase it.

## 2021-12-27 ENCOUNTER — OFFICE VISIT (OUTPATIENT)
Dept: ONCOLOGY | Facility: CLINIC | Age: 71
End: 2021-12-27

## 2021-12-27 ENCOUNTER — LAB (OUTPATIENT)
Dept: LAB | Facility: HOSPITAL | Age: 71
End: 2021-12-27

## 2021-12-27 VITALS
WEIGHT: 191.4 LBS | TEMPERATURE: 96.9 F | RESPIRATION RATE: 16 BRPM | OXYGEN SATURATION: 99 % | DIASTOLIC BLOOD PRESSURE: 70 MMHG | BODY MASS INDEX: 25.92 KG/M2 | HEIGHT: 72 IN | SYSTOLIC BLOOD PRESSURE: 122 MMHG | HEART RATE: 56 BPM

## 2021-12-27 DIAGNOSIS — Z79.01 CHRONIC ANTICOAGULATION: ICD-10-CM

## 2021-12-27 DIAGNOSIS — I26.99 BILATERAL PULMONARY EMBOLISM (HCC): ICD-10-CM

## 2021-12-27 DIAGNOSIS — I82.551 CHRONIC DEEP VEIN THROMBOSIS (DVT) OF RIGHT PERONEAL VEIN (HCC): ICD-10-CM

## 2021-12-27 DIAGNOSIS — R09.02 HYPOXIA: ICD-10-CM

## 2021-12-27 DIAGNOSIS — R74.8 ELEVATED LIVER ENZYMES: ICD-10-CM

## 2021-12-27 DIAGNOSIS — I82.432 ACUTE DEEP VEIN THROMBOSIS (DVT) OF POPLITEAL VEIN OF LEFT LOWER EXTREMITY (HCC): Primary | ICD-10-CM

## 2021-12-27 DIAGNOSIS — D64.9 NORMOCHROMIC NORMOCYTIC ANEMIA: ICD-10-CM

## 2021-12-27 LAB
ALBUMIN SERPL-MCNC: 4.3 G/DL (ref 3.5–5.2)
ALBUMIN/GLOB SERPL: 1.7 G/DL (ref 1.1–2.4)
ALP SERPL-CCNC: 66 U/L (ref 38–116)
ALT SERPL W P-5'-P-CCNC: 52 U/L (ref 0–41)
ANION GAP SERPL CALCULATED.3IONS-SCNC: 8.6 MMOL/L (ref 5–15)
AST SERPL-CCNC: 26 U/L (ref 0–40)
BASOPHILS # BLD AUTO: 0.04 10*3/MM3 (ref 0–0.2)
BASOPHILS NFR BLD AUTO: 0.6 % (ref 0–1.5)
BILIRUB SERPL-MCNC: 0.4 MG/DL (ref 0.2–1.2)
BUN SERPL-MCNC: 18 MG/DL (ref 6–20)
BUN/CREAT SERPL: 17.8 (ref 7.3–30)
CALCIUM SPEC-SCNC: 9.4 MG/DL (ref 8.5–10.2)
CHLORIDE SERPL-SCNC: 105 MMOL/L (ref 98–107)
CO2 SERPL-SCNC: 30.4 MMOL/L (ref 22–29)
CREAT SERPL-MCNC: 1.01 MG/DL (ref 0.7–1.3)
DEPRECATED RDW RBC AUTO: 48.4 FL (ref 37–54)
EOSINOPHIL # BLD AUTO: 0.17 10*3/MM3 (ref 0–0.4)
EOSINOPHIL NFR BLD AUTO: 2.5 % (ref 0.3–6.2)
ERYTHROCYTE [DISTWIDTH] IN BLOOD BY AUTOMATED COUNT: 14.5 % (ref 12.3–15.4)
GFR SERPL CREATININE-BSD FRML MDRD: 73 ML/MIN/1.73
GLOBULIN UR ELPH-MCNC: 2.6 GM/DL (ref 1.8–3.5)
GLUCOSE SERPL-MCNC: 88 MG/DL (ref 74–124)
HCT VFR BLD AUTO: 43.7 % (ref 37.5–51)
HGB BLD-MCNC: 13.9 G/DL (ref 13–17.7)
IMM GRANULOCYTES # BLD AUTO: 0.02 10*3/MM3 (ref 0–0.05)
IMM GRANULOCYTES NFR BLD AUTO: 0.3 % (ref 0–0.5)
LYMPHOCYTES # BLD AUTO: 2.25 10*3/MM3 (ref 0.7–3.1)
LYMPHOCYTES NFR BLD AUTO: 33 % (ref 19.6–45.3)
MCH RBC QN AUTO: 29.1 PG (ref 26.6–33)
MCHC RBC AUTO-ENTMCNC: 31.8 G/DL (ref 31.5–35.7)
MCV RBC AUTO: 91.4 FL (ref 79–97)
MONOCYTES # BLD AUTO: 0.55 10*3/MM3 (ref 0.1–0.9)
MONOCYTES NFR BLD AUTO: 8.1 % (ref 5–12)
NEUTROPHILS NFR BLD AUTO: 3.78 10*3/MM3 (ref 1.7–7)
NEUTROPHILS NFR BLD AUTO: 55.5 % (ref 42.7–76)
NRBC BLD AUTO-RTO: 0 /100 WBC (ref 0–0.2)
PLATELET # BLD AUTO: 265 10*3/MM3 (ref 140–450)
PMV BLD AUTO: 9.5 FL (ref 6–12)
POTASSIUM SERPL-SCNC: 4.6 MMOL/L (ref 3.5–4.7)
PROT SERPL-MCNC: 6.9 G/DL (ref 6.3–8)
RBC # BLD AUTO: 4.78 10*6/MM3 (ref 4.14–5.8)
SODIUM SERPL-SCNC: 144 MMOL/L (ref 134–145)
WBC NRBC COR # BLD: 6.81 10*3/MM3 (ref 3.4–10.8)

## 2021-12-27 PROCEDURE — 99215 OFFICE O/P EST HI 40 MIN: CPT | Performed by: INTERNAL MEDICINE

## 2021-12-27 PROCEDURE — 36415 COLL VENOUS BLD VENIPUNCTURE: CPT

## 2021-12-27 PROCEDURE — 85025 COMPLETE CBC W/AUTO DIFF WBC: CPT

## 2021-12-27 PROCEDURE — 80053 COMPREHEN METABOLIC PANEL: CPT

## 2021-12-27 RX ORDER — PREDNISONE 50 MG/1
50 TABLET ORAL SEE ADMIN INSTRUCTIONS
Qty: 3 TABLET | Refills: 0 | Status: SHIPPED | OUTPATIENT
Start: 2021-12-27 | End: 2022-08-05

## 2021-12-27 NOTE — PROGRESS NOTES
Site of Appt/Pickup: ( ) [place]; (y) [place]; ( ) [place];    Prescriber (TAYLA) contacted pharmacy to obtain the medication below.    NDC:  74566-889-17  Drug name: Xarelto  Strength: 15 mg  Total Quantity:  42 (Containers- 6 X Units per Containers- 7)  Lot#:  [id number]  Expiration: 03/2023    Prescriber or staff member who picked up medication Padmaja

## 2021-12-27 NOTE — PROGRESS NOTES
"Subjective     CHIEF COMPLAINT:      Chief Complaint   Patient presents with   • Follow-up     discuss blood clot       HISTORY OF PRESENT ILLNESS:     Jerad Torres is a 71 y.o. male patient who returns today for follow up on lower extremity deep vein thrombosis.  Patient was on prophylactic Xarelto 10 mg daily.  He went on a trip by car about 10 days ago.  On 12/14/2021, he started experiencing pain in the left thigh and had difficulty standing up and walking as a result.  The pain gradually increased in intensity.  He also started experiencing pain in the right leg.  He went to emergency department on 12/19/2021 and a venous Doppler revealed acute deep vein thrombosis in the left popliteal vein.  Xarelto was increased from 10mg to 20 mg daily.    Patient continues to have severe pain.  He is needing to take Tylenol almost around-the-clock to treat with the pain.    Patient had a drop in his oxygen level to 94% few days ago.  It improved after he took multiple deep breaths.  He denies having chest pain or shortness of breath.    Patient is not having problem with bleeding or bruising.      ROS:  Pertinent ROS is in the HPI.     Past medical, surgical, social and family history were reviewed.     MEDICATIONS:    Current Outpatient Medications:   •  loratadine (CLARITIN) 10 MG tablet, Take 10 mg by mouth As Needed., Disp: , Rfl:   •  rivaroxaban (XARELTO) 20 MG tablet, Take 1 tablet by mouth Daily., Disp: 90 tablet, Rfl: 1    Objective   VITAL SIGNS:     Vitals:    12/27/21 1145   BP: 122/70   Pulse: 56   Resp: 16   Temp: 96.9 °F (36.1 °C)   TempSrc: Temporal   SpO2: 99%   Weight: 86.8 kg (191 lb 6.4 oz)   Height: 183 cm (72.05\")   PainSc:   6   PainLoc: Leg  Comment: both     Body mass index is 25.92 kg/m².     Wt Readings from Last 5 Encounters:   12/27/21 86.8 kg (191 lb 6.4 oz)   11/11/21 86 kg (189 lb 8 oz)   11/10/20 97.1 kg (214 lb 1.6 oz)   05/26/20 95.5 kg (210 lb 9.6 oz)   01/14/20 98.7 kg (217 lb 8 oz) "       PHYSICAL EXAMINATION:   GENERAL: The patient appears in good general condition, not in acute distress.   SKIN: No ecchymosis.  EYES: No jaundice.  LYMPHATICS: No cervical lymphadenopathy.  CHEST: Normal respiratory effort.  Lungs clear bilaterally.  No added sounds.  CVS: Normal S1-S2.  No murmurs..  ABDOMEN: Soft. No tenderness. No Hepatomegaly. No Splenomegaly. No masses.  EXTREMITIES: Right calf tenderness.  The right calf is warm.  There is tenderness in the left distal femoral and in the popliteal areas with mild warmth.  No skin color change.    DIAGNOSTIC DATA:     Results from last 7 days   Lab Units 12/27/21  1100   WBC 10*3/mm3 6.81   NEUTROS ABS 10*3/mm3 3.78   HEMOGLOBIN g/dL 13.9   HEMATOCRIT % 43.7   PLATELETS 10*3/mm3 265     Results from last 7 days   Lab Units 12/27/21  1100   SODIUM mmol/L 144   POTASSIUM mmol/L 4.6   CHLORIDE mmol/L 105   CO2 mmol/L 30.4*   BUN mg/dL 18   CREATININE mg/dL 1.01   CALCIUM mg/dL 9.4   ALBUMIN g/dL 4.30   BILIRUBIN mg/dL 0.4   ALK PHOS U/L 66   ALT (SGPT) U/L 52*   AST (SGOT) U/L 26   GLUCOSE mg/dL 88     Venous Doppler on 12/19/2021:  1. Acute deep venous thrombosis noted in the left popliteal vein(s).     2. Appears to be thickened walls in the left lesser saphenous vein           Assessment/Plan   *Right lower extremity deep vein thrombosis and bilateral pulmonary embolism diagnosed on 10/8/2019.    · The CT scan at [facility] reported bilateral extensive pulmonary embolism.    · The largest was in the right main pulmonary artery into the lobar arteries.    · There was suspected infarction in the left lung base.  · Venous doppler showed acute right lower extremity deep vein thrombosis in the femoral, popliteal and gastrocnemius veins.   · The DVT and PE developed after driving by car to [place].    · Patient was placed on IV heparin and discharged home on Xarelto.  · Venous doppler on 1/7/2020 showed the thrombosis to have become  subacute.  · CT scan of the chest on 1/9/2020 showed complete resolution of the pulmonary embolism.   · Venous doppler on 5/14/2020 showed significant improvement in the DVT. There was a residual non- occlusive chronic thrombus in the right tibial-peroneal trunk.   · Xarelto was reduced to 10 mg daily on 11/10/2020.  · Patient started on 12/14/2021 having pain in the left lower extremity.  He was traveling at that time.  He later on started having pain in the right lower extremity.  Venous Doppler.  Venous Doppler on 12/19/2021 revealed acute DVT in the left popliteal vein.  · The dose of Xarelto was increased to 20 mg daily on 12/19/2021.  · Patient continues to have severe pain in the left lower extremity.  He also has pain in the right leg.  He is taking Tylenol around-the-clock to improve the pain.  · This is concerning for his DVT not improving with the current dose of Xarelto.  · The location of the pain in the left thigh is concerning for the presence of the femoral vein DVT.  The pain in the right leg is concerning for an acute DVT in the right lower extremity.  · Patient had an episode of drop in the oxygen level to 94% and he was not feeling good at that time.  This is concerning for development of pulmonary embolism.    *Normochromic normocytic anemia.    · Hemoglobin was 14.9 on 11/3/2020  · Hemoglobin decreased to 13.5 on 11/11/2021.    · There was no evidence of iron or vitamin B12 deficiency anemia.    · Hemoglobin improved to 13.9 today.   · No symptoms or signs of blood loss to explain the anemia.    *Elevated ALT.  AST is normal.  · We will repeat CMP at his return visit in 3 weeks.    *Positive test for lupus anticoagulant, anti-phosphatidyl ethanolamine IgG antibody and anti-beta-2 glycoprotein IgG antibody on 10/24/2019.   · The positive lupus anticoagulant was most likely a false positive test due to Xarelto.  · the other 2 positive antibodies are concerning for an underlying antiphospholipid  syndrome. However, only anti beta-2 glycoprotein antibody is considered clinically significant for a diagnosis of antiphospholipid syndrome.   · The patient's family history is positive for DVT in a sister who had a negative thrombophilia testing.   · Repeat testing on 1/7/2020 showed that the anti beta-2 glycoprotein was still positive. The titer was slightly higher. Anticardiolipin antibody was negative.  · Since the patient was responding to Xarelto and the pulmonary embolism resolved, he was continued on Xarelto.  · Patient continues to have beta-2 glycoprotein IgG antibody on repeat testing. Anticardiolipin antibody was negative.  · Antiphosphatidyl ethanolamine antibody decreased from 33 to 18 U/mL.  · Beta-2 glycoprotein and anticardiolipin antibodies became negative on 11/3/2020.   · Therefore, the patient was continued on Xarelto.    ·     PLAN:    1.  Obtain bilateral lower extremity venous Dopplers.  2.  Obtain CT angiogram chest to evaluate for pulmonary embolism.  Due to his contrast allergy, he will be given prednisone 50 mg as a premedication 13 hours, 7 hours and 1 hour before the CT scan.  3.  We will increase Xarelto to 15 mg twice daily for 21 days.  After that, the dose will be reduced to 20 mg daily.  4.  I will see him in follow-up in 3 weeks.      I spent 45 minutes caring for Jerad on this date of service. This time includes time spent by me in the following activities: preparing for the visit, reviewing tests, obtaining and/or reviewing a separately obtained history, performing a medically appropriate examination and/or evaluation, counseling and educating the patient/family/caregiver, ordering medications, tests, or procedures, documenting information in the medical record, independently interpreting results and communicating that information with the patient/family/caregiver and care coordination       Kat Umaña MD  12/27/21

## 2021-12-28 ENCOUNTER — TELEPHONE (OUTPATIENT)
Dept: ONCOLOGY | Facility: CLINIC | Age: 71
End: 2021-12-28

## 2021-12-28 ENCOUNTER — HOSPITAL ENCOUNTER (OUTPATIENT)
Dept: CARDIOLOGY | Facility: HOSPITAL | Age: 71
Discharge: HOME | End: 2021-12-28

## 2021-12-28 ENCOUNTER — HOSPITAL ENCOUNTER (OUTPATIENT)
Dept: CT IMAGING | Facility: HOSPITAL | Age: 71
Discharge: HOME | End: 2021-12-28

## 2021-12-28 DIAGNOSIS — I26.99 BILATERAL PULMONARY EMBOLISM (HCC): ICD-10-CM

## 2021-12-28 DIAGNOSIS — I82.551 CHRONIC DEEP VEIN THROMBOSIS (DVT) OF RIGHT PERONEAL VEIN (HCC): ICD-10-CM

## 2021-12-28 LAB
BH CV LOW VAS LEFT LESSER SAPH VESSEL: 1
BH CV LOW VAS RIGHT DISTAL FEMORAL SPONT: 1
BH CV LOW VAS RIGHT GREATER SAPH AK VESSEL: 1
BH CV LOWER VASCULAR LEFT COMMON FEMORAL AUGMENT: NORMAL
BH CV LOWER VASCULAR LEFT COMMON FEMORAL COMPETENT: NORMAL
BH CV LOWER VASCULAR LEFT COMMON FEMORAL COMPRESS: NORMAL
BH CV LOWER VASCULAR LEFT COMMON FEMORAL PHASIC: NORMAL
BH CV LOWER VASCULAR LEFT COMMON FEMORAL SPONT: NORMAL
BH CV LOWER VASCULAR LEFT DISTAL FEMORAL COMPRESS: NORMAL
BH CV LOWER VASCULAR LEFT GREATER SAPH AK COMPRESS: NORMAL
BH CV LOWER VASCULAR LEFT LESSER SAPH COMPRESS: NORMAL
BH CV LOWER VASCULAR LEFT MID FEMORAL AUGMENT: NORMAL
BH CV LOWER VASCULAR LEFT MID FEMORAL COMPETENT: NORMAL
BH CV LOWER VASCULAR LEFT MID FEMORAL COMPRESS: NORMAL
BH CV LOWER VASCULAR LEFT MID FEMORAL PHASIC: NORMAL
BH CV LOWER VASCULAR LEFT MID FEMORAL SPONT: NORMAL
BH CV LOWER VASCULAR LEFT PERONEAL COMPRESS: NORMAL
BH CV LOWER VASCULAR LEFT POPLITEAL AUGMENT: NORMAL
BH CV LOWER VASCULAR LEFT POPLITEAL COMPETENT: NORMAL
BH CV LOWER VASCULAR LEFT POPLITEAL COMPRESS: NORMAL
BH CV LOWER VASCULAR LEFT POPLITEAL PHASIC: NORMAL
BH CV LOWER VASCULAR LEFT POPLITEAL SPONT: NORMAL
BH CV LOWER VASCULAR LEFT POSTERIOR TIBIAL COMPRESS: NORMAL
BH CV LOWER VASCULAR LEFT PROXIMAL FEMORAL COMPRESS: NORMAL
BH CV LOWER VASCULAR RIGHT COMMON FEMORAL AUGMENT: NORMAL
BH CV LOWER VASCULAR RIGHT COMMON FEMORAL COMPETENT: NORMAL
BH CV LOWER VASCULAR RIGHT COMMON FEMORAL COMPRESS: NORMAL
BH CV LOWER VASCULAR RIGHT COMMON FEMORAL PHASIC: NORMAL
BH CV LOWER VASCULAR RIGHT COMMON FEMORAL SPONT: NORMAL
BH CV LOWER VASCULAR RIGHT DISTAL FEMORAL AUGMENT: NORMAL
BH CV LOWER VASCULAR RIGHT DISTAL FEMORAL COMPETENT: NORMAL
BH CV LOWER VASCULAR RIGHT DISTAL FEMORAL COMPRESS: NORMAL
BH CV LOWER VASCULAR RIGHT DISTAL FEMORAL PHASIC: NORMAL
BH CV LOWER VASCULAR RIGHT DISTAL FEMORAL SPONT: NORMAL
BH CV LOWER VASCULAR RIGHT GREATER SAPH AK COMPRESS: NORMAL
BH CV LOWER VASCULAR RIGHT MID FEMORAL AUGMENT: NORMAL
BH CV LOWER VASCULAR RIGHT MID FEMORAL COMPETENT: NORMAL
BH CV LOWER VASCULAR RIGHT MID FEMORAL COMPRESS: NORMAL
BH CV LOWER VASCULAR RIGHT MID FEMORAL PHASIC: NORMAL
BH CV LOWER VASCULAR RIGHT MID FEMORAL SPONT: NORMAL
BH CV LOWER VASCULAR RIGHT PERONEAL COMPRESS: NORMAL
BH CV LOWER VASCULAR RIGHT POPLITEAL AUGMENT: NORMAL
BH CV LOWER VASCULAR RIGHT POPLITEAL COMPETENT: NORMAL
BH CV LOWER VASCULAR RIGHT POPLITEAL COMPRESS: NORMAL
BH CV LOWER VASCULAR RIGHT POPLITEAL PHASIC: NORMAL
BH CV LOWER VASCULAR RIGHT POPLITEAL SPONT: NORMAL
BH CV LOWER VASCULAR RIGHT POSTERIOR TIBIAL COMPRESS: NORMAL
BH CV LOWER VASCULAR RIGHT PROXIMAL FEMORAL COMPRESS: NORMAL
MAXIMAL PREDICTED HEART RATE: 150 BPM
STRESS TARGET HR: 128 BPM

## 2021-12-28 PROCEDURE — 0 IOPAMIDOL PER 1 ML: Performed by: INTERNAL MEDICINE

## 2021-12-28 PROCEDURE — 71275 CT ANGIOGRAPHY CHEST: CPT

## 2021-12-28 PROCEDURE — 93970 EXTREMITY STUDY: CPT

## 2021-12-28 PROCEDURE — 93970 EXTREMITY STUDY: CPT | Performed by: SURGERY

## 2021-12-28 RX ADMIN — IOPAMIDOL 100 ML: 755 INJECTION, SOLUTION INTRAVENOUS at 18:11

## 2021-12-28 NOTE — TELEPHONE ENCOUNTER
Let pts wife know that after Dr. Umaña reviews the results he or I will contact them with the results. She v/u

## 2021-12-28 NOTE — TELEPHONE ENCOUNTER
Caller: MELVIN PERDUE    Relationship: Emergency Contact    Best call back number: [phone] MAY CALL ANYTIME AND LEAVE VM IF NEEDED.    Who are you requesting to speak with (clinical staff, provider,  specific staff member): CLINICAL    What was the call regarding: PATIENT'S SPOUSE CALLED IN WANTING TO KNOW HOW WILL THE PATIENT RECEIVE HIS RESULTS FROM THE IMAGING HE HAD DONE TODAY 12/28/2021.  ONCE ADDRESSED PLEASE CONTACT PATIENT ANDOR SPOUSE BACK ASAP.    Do you require a callback: YES      DJC/SULLY

## 2021-12-29 ENCOUNTER — APPOINTMENT (OUTPATIENT)
Dept: OTHER | Facility: HOSPITAL | Age: 71
End: 2021-12-29

## 2021-12-30 ENCOUNTER — TELEPHONE (OUTPATIENT)
Dept: ONCOLOGY | Facility: CLINIC | Age: 71
End: 2021-12-30

## 2021-12-30 NOTE — TELEPHONE ENCOUNTER
----- Message from Kat Umaña MD sent at 12/30/2021 11:30 AM EST -----  Hi    The CT chest is clear. No blood clots in the lungs.      The venous doppler showed the blood clots to be chronic. He has blood clots in the right femoral and popliteal veins. He also has superficial blood clots in the right thigh and left leg. We verified with vascular that the side is correct. I would continue with the same plan for the higher dose Xarelto for 3 weeks as we discussed during the office visit.     Thank you    ----- Message -----  From: Francoise Shankar RN  Sent: 12/30/2021  11:15 AM EST  To: Kat Umaña MD    [facility]  Are you okay with me giving the pt and wife the imaging results? From what I saw everything looks normal

## 2021-12-30 NOTE — TELEPHONE ENCOUNTER
MELVIN IS CALLING BACK, ANXIOUS ABOUT THE RESULTS. SHE DID NOT EXPECT TO WAIT THIS LONG TO RECEIVE THEM.    PLEASE CALL HER

## 2021-12-30 NOTE — TELEPHONE ENCOUNTER
Informed pt of Dr. Umaña's note and reviewed results with the pt as well as to continue on the current plan. Pt had no questions and v/u.

## 2022-01-17 ENCOUNTER — OFFICE VISIT (OUTPATIENT)
Dept: ONCOLOGY | Facility: CLINIC | Age: 72
End: 2022-01-17

## 2022-01-17 ENCOUNTER — LAB (OUTPATIENT)
Dept: LAB | Facility: HOSPITAL | Age: 72
End: 2022-01-17

## 2022-01-17 VITALS
TEMPERATURE: 97.5 F | HEIGHT: 71 IN | BODY MASS INDEX: 26.75 KG/M2 | SYSTOLIC BLOOD PRESSURE: 146 MMHG | WEIGHT: 191.1 LBS | OXYGEN SATURATION: 99 % | DIASTOLIC BLOOD PRESSURE: 88 MMHG | HEART RATE: 59 BPM | RESPIRATION RATE: 14 BRPM

## 2022-01-17 DIAGNOSIS — I82.551 CHRONIC DEEP VEIN THROMBOSIS (DVT) OF RIGHT PERONEAL VEIN: ICD-10-CM

## 2022-01-17 DIAGNOSIS — I26.99 BILATERAL PULMONARY EMBOLISM: ICD-10-CM

## 2022-01-17 DIAGNOSIS — I82.432 ACUTE DEEP VEIN THROMBOSIS (DVT) OF POPLITEAL VEIN OF LEFT LOWER EXTREMITY: Primary | ICD-10-CM

## 2022-01-17 DIAGNOSIS — R74.8 ELEVATED LIVER ENZYMES: ICD-10-CM

## 2022-01-17 DIAGNOSIS — Z79.01 CHRONIC ANTICOAGULATION: ICD-10-CM

## 2022-01-17 LAB
ALBUMIN SERPL-MCNC: 4.5 G/DL (ref 3.5–5.2)
ALBUMIN/GLOB SERPL: 1.7 G/DL (ref 1.1–2.4)
ALP SERPL-CCNC: 69 U/L (ref 38–116)
ALT SERPL W P-5'-P-CCNC: 19 U/L (ref 0–41)
ANION GAP SERPL CALCULATED.3IONS-SCNC: 8.9 MMOL/L (ref 5–15)
AST SERPL-CCNC: 15 U/L (ref 0–40)
BASOPHILS # BLD AUTO: 0.07 10*3/MM3 (ref 0–0.2)
BASOPHILS NFR BLD AUTO: 0.8 % (ref 0–1.5)
BILIRUB SERPL-MCNC: 0.5 MG/DL (ref 0.2–1.2)
BUN SERPL-MCNC: 14 MG/DL (ref 6–20)
BUN/CREAT SERPL: 12.5 (ref 7.3–30)
CALCIUM SPEC-SCNC: 9.2 MG/DL (ref 8.5–10.2)
CHLORIDE SERPL-SCNC: 103 MMOL/L (ref 98–107)
CO2 SERPL-SCNC: 29.1 MMOL/L (ref 22–29)
CREAT SERPL-MCNC: 1.12 MG/DL (ref 0.7–1.3)
DEPRECATED RDW RBC AUTO: 49.3 FL (ref 37–54)
EOSINOPHIL # BLD AUTO: 0.19 10*3/MM3 (ref 0–0.4)
EOSINOPHIL NFR BLD AUTO: 2.2 % (ref 0.3–6.2)
ERYTHROCYTE [DISTWIDTH] IN BLOOD BY AUTOMATED COUNT: 14.6 % (ref 12.3–15.4)
GFR SERPL CREATININE-BSD FRML MDRD: 65 ML/MIN/1.73
GLOBULIN UR ELPH-MCNC: 2.6 GM/DL (ref 1.8–3.5)
GLUCOSE SERPL-MCNC: 89 MG/DL (ref 74–124)
HCT VFR BLD AUTO: 45.3 % (ref 37.5–51)
HGB BLD-MCNC: 14.8 G/DL (ref 13–17.7)
IMM GRANULOCYTES # BLD AUTO: 0.02 10*3/MM3 (ref 0–0.05)
IMM GRANULOCYTES NFR BLD AUTO: 0.2 % (ref 0–0.5)
LYMPHOCYTES # BLD AUTO: 2.67 10*3/MM3 (ref 0.7–3.1)
LYMPHOCYTES NFR BLD AUTO: 31.5 % (ref 19.6–45.3)
MCH RBC QN AUTO: 30 PG (ref 26.6–33)
MCHC RBC AUTO-ENTMCNC: 32.7 G/DL (ref 31.5–35.7)
MCV RBC AUTO: 91.9 FL (ref 79–97)
MONOCYTES # BLD AUTO: 0.74 10*3/MM3 (ref 0.1–0.9)
MONOCYTES NFR BLD AUTO: 8.7 % (ref 5–12)
NEUTROPHILS NFR BLD AUTO: 4.79 10*3/MM3 (ref 1.7–7)
NEUTROPHILS NFR BLD AUTO: 56.6 % (ref 42.7–76)
NRBC BLD AUTO-RTO: 0 /100 WBC (ref 0–0.2)
PLATELET # BLD AUTO: 172 10*3/MM3 (ref 140–450)
PMV BLD AUTO: 10.3 FL (ref 6–12)
POTASSIUM SERPL-SCNC: 4.3 MMOL/L (ref 3.5–4.7)
PROT SERPL-MCNC: 7.1 G/DL (ref 6.3–8)
RBC # BLD AUTO: 4.93 10*6/MM3 (ref 4.14–5.8)
SODIUM SERPL-SCNC: 141 MMOL/L (ref 134–145)
WBC NRBC COR # BLD: 8.48 10*3/MM3 (ref 3.4–10.8)

## 2022-01-17 PROCEDURE — 36415 COLL VENOUS BLD VENIPUNCTURE: CPT

## 2022-01-17 PROCEDURE — 80053 COMPREHEN METABOLIC PANEL: CPT | Performed by: INTERNAL MEDICINE

## 2022-01-17 PROCEDURE — 85025 COMPLETE CBC W/AUTO DIFF WBC: CPT

## 2022-01-17 PROCEDURE — 99214 OFFICE O/P EST MOD 30 MIN: CPT | Performed by: INTERNAL MEDICINE

## 2022-01-17 NOTE — PROGRESS NOTES
"Subjective     CHIEF COMPLAINT:      Chief Complaint   Patient presents with   • Follow-up       HISTORY OF PRESENT ILLNESS:     Jerad Torres is a 71 y.o. male patient who returns today for follow up on his lower extremity deep vein thrombosis.  He is on Xarelto 15 mg twice daily.  He is tolerating it without problem with bleeding or bruising.  He is no longer having chest pain.  The leg pain has significantly improved.  He noticed significant improvement after his last visit on 12/27/2021.    ROS:  Pertinent ROS is in the HPI.     Past medical, surgical, social and family history were reviewed.     MEDICATIONS:    Current Outpatient Medications:   •  loratadine (CLARITIN) 10 MG tablet, Take 10 mg by mouth As Needed., Disp: , Rfl:   •  predniSONE (DELTASONE) 50 MG tablet, Take 1 tablet by mouth See Admin Instructions. Take 1 tablet 13 hours, 7 hours, and 1 hour prior to scan, Disp: 3 tablet, Rfl: 0  •  rivaroxaban (XARELTO) 15 MG tablet, Take 1 tablet by mouth 2 (Two) Times a Day With Meals for 21 days. Indications: DVT/PE (active thrombosis), Disp: 42 tablet, Rfl: 0    Objective   VITAL SIGNS:     Vitals:    01/17/22 1236   BP: 146/88   Pulse: 59   Resp: 14   Temp: 97.5 °F (36.4 °C)   TempSrc: Infrared   SpO2: 99%   Weight: 86.7 kg (191 lb 1.6 oz)   Height: 180 cm (70.87\")  Comment: new ht   PainSc: 0-No pain     Body mass index is 26.75 kg/m².     Wt Readings from Last 5 Encounters:   01/17/22 86.7 kg (191 lb 1.6 oz)   12/27/21 86.8 kg (191 lb 6.4 oz)   11/11/21 86 kg (189 lb 8 oz)   11/10/20 97.1 kg (214 lb 1.6 oz)   05/26/20 95.5 kg (210 lb 9.6 oz)       PHYSICAL EXAMINATION:   GENERAL: The patient appears in good general condition, not in acute distress.   SKIN: No ecchymosis.  EYES: No jaundice.  CHEST: Normal respiratory effort.  Lungs clear bilaterally.  No added sounds.  CVS: Normal S1-S2.  No murmurs.  ABDOMEN: Nondistended  EXTREMITIES: No edema.  No calf tenderness.  No size difference between the " legs.    DIAGNOSTIC DATA:     Results from last 7 days   Lab Units 01/17/22  1220   WBC 10*3/mm3 8.48   NEUTROS ABS 10*3/mm3 4.79   HEMOGLOBIN g/dL 14.8   HEMATOCRIT % 45.3   PLATELETS 10*3/mm3 172     Results from last 7 days   Lab Units 01/17/22  1321   SODIUM mmol/L 141   POTASSIUM mmol/L 4.3   CHLORIDE mmol/L 103   CO2 mmol/L 29.1*   BUN mg/dL 14   CREATININE mg/dL 1.12   CALCIUM mg/dL 9.2   ALBUMIN g/dL 4.50   BILIRUBIN mg/dL 0.5   ALK PHOS U/L 69   ALT (SGPT) U/L 19   AST (SGOT) U/L 15   GLUCOSE mg/dL 89     CT angiogram chest on 12/28/2021:  1. No acute disease in the chest.  2. No evidence of thrombus or embolus in the right or left pulmonary artery branches.  3. Constipation    Venous Doppler on 12/28/2021:  · Chronic right lower extremity superficial thrombophlebitis noted in the great saphenous (above knee).  · Chronic right lower extremity deep vein thrombosis noted in the distal femoral and popliteal.  · Chronic left lower extremity superficial thrombophlebitis noted in the small saphenous.  · All other veins appeared normal bilaterally.    Assessment/Plan   *Right lower extremity deep vein thrombosis and bilateral pulmonary embolism diagnosed on 10/8/2019.    · The CT scan at [facility] reported bilateral extensive pulmonary embolism.    · The largest was in the right main pulmonary artery into the lobar arteries.    · There was suspected infarction in the left lung base.  · Venous doppler showed acute right lower extremity deep vein thrombosis in the femoral, popliteal and gastrocnemius veins.   · The DVT and PE developed after driving by car to [place].    · Patient was placed on IV heparin and discharged home on Xarelto.  · Venous doppler on 1/7/2020 showed the thrombosis to have become subacute.  · CT scan of the chest on 1/9/2020 showed complete resolution of the pulmonary embolism.   · Venous doppler on 5/14/2020 showed significant improvement in the DVT. There was a  residual non- occlusive chronic thrombus in the right tibial-peroneal trunk.   · Xarelto was reduced to 10 mg daily on 11/10/2020.  · Patient started on 12/14/2021 having pain in the left lower extremity.  He was traveling at that time.  He later on started having pain in the right lower extremity.   · Venous Doppler on 12/19/2021 revealed acute DVT in the left popliteal vein.  · The dose of Xarelto was increased to 20 mg daily on 12/19/2021.  · The patient was seen on 12/27/2021, he was complaining of severe pain in the left lower extremity.  He also had pain in the right leg.  · We increased the dose of Xarelto to 15 mg twice daily for 21 days to be changed to 20 mg daily afterwards.  · Venous Doppler on 12/28/2021 revealed chronic DVT in the distal femoral and popliteal veins, chronic STP in the greater saphenous above the knee and chronic STP in the left small saphenous veins.  There was no acute deep vein thrombosis in the left lower extremity.  · Patient reports significant improvement in his symptoms.  He is not having significant leg pain.  No chest pain or shortness of breath.  · Patient is tolerating Xarelto with no problem with bleeding.    *Normochromic normocytic anemia.    · Hemoglobin was 14.9 on 11/3/2020  · Hemoglobin decreased to 13.5 on 11/11/2021.    · There was no evidence of iron or vitamin B12 deficiency anemia.    · Hemoglobin improved to 13.9 on 12/27/2021.  · No symptoms or signs of blood loss to explain the anemia.  · Hemoglobin is 14.8 today.    *Elevated ALT on 12/27/2021.  AST is normal.  · Repeat CMP today showed normal liver enzymes    *Positive test for lupus anticoagulant, anti-phosphatidyl ethanolamine IgG antibody and anti-beta-2 glycoprotein IgG antibody on 10/24/2019.   · The positive lupus anticoagulant was most likely a false positive test due to Xarelto.  · the other 2 positive antibodies are concerning for an underlying antiphospholipid syndrome. However, only anti beta-2  glycoprotein antibody is considered clinically significant for a diagnosis of antiphospholipid syndrome.   · The patient's family history is positive for DVT in a sister who had a negative thrombophilia testing.   · Repeat testing on 1/7/2020 showed that the anti beta-2 glycoprotein was still positive. The titer was slightly higher. Anticardiolipin antibody was negative.  · Since the patient was responding to Xarelto and the pulmonary embolism resolved, he was continued on Xarelto.  · Patient continues to have beta-2 glycoprotein IgG antibody on repeat testing. Anticardiolipin antibody was negative.  · Antiphosphatidyl ethanolamine antibody decreased from 33 to 18 U/mL.  · Beta-2 glycoprotein and anticardiolipin antibodies became negative on 11/3/2020.   · Therefore, the patient was continued on Xarelto.    PLAN:    1.  We will change the dose of Xarelto to 20 mg daily starting on 1/19/2022.    2.  I recommended receiving the COVID-19 vaccine.    3.  Follow-up in 6 months with CBC and CMP.  We will see him sooner if there are any changes.  4.  Patient will need lifelong full dose anticoagulation as long as he is not having problems related to anticoagulation.      Kat Umaña MD  01/17/22

## 2022-01-18 ENCOUNTER — TELEPHONE (OUTPATIENT)
Dept: ONCOLOGY | Facility: CLINIC | Age: 72
End: 2022-01-18

## 2022-01-27 ENCOUNTER — TELEPHONE (OUTPATIENT)
Dept: ONCOLOGY | Facility: CLINIC | Age: 72
End: 2022-01-27

## 2022-01-27 NOTE — TELEPHONE ENCOUNTER
Caller: MELVIN PERDUE    Relationship: Emergency Contact    Best call back number: [phone]    Chief complaint: PATIENT TO RESCHED 7/25/22 APPTS    Type of visit: LAB AND FU    Requested date: PATIENT OUT OF TOWN 7/25-8/1 PATIENT WOULD PREFER 7/22/22 IN THE MORNING OR ANYTIME THE WEEK PRIOR

## 2022-06-09 ENCOUNTER — TELEPHONE (OUTPATIENT)
Dept: ONCOLOGY | Facility: CLINIC | Age: 72
End: 2022-06-09

## 2022-06-09 NOTE — TELEPHONE ENCOUNTER
Caller: MELVIN PERDUE    Relationship: Emergency Contact    Best call back number: [phone]    Requested Prescriptions:   XARELTO 20 MG 90 DAY SUPPLY       Pharmacy where request should be sent:    [facility] - [place] [phone] [facility] [id number]      Does the patient have less than a 3 day supply:  [] Yes  [x] No    Armando HICKS Rep   06/09/22 11:02 EDT

## 2022-08-05 ENCOUNTER — OFFICE VISIT (OUTPATIENT)
Dept: ONCOLOGY | Facility: CLINIC | Age: 72
End: 2022-08-05

## 2022-08-05 ENCOUNTER — LAB (OUTPATIENT)
Dept: LAB | Facility: HOSPITAL | Age: 72
End: 2022-08-05

## 2022-08-05 VITALS
WEIGHT: 194.1 LBS | OXYGEN SATURATION: 98 % | HEART RATE: 53 BPM | DIASTOLIC BLOOD PRESSURE: 80 MMHG | HEIGHT: 71 IN | RESPIRATION RATE: 16 BRPM | BODY MASS INDEX: 27.17 KG/M2 | TEMPERATURE: 97.1 F | SYSTOLIC BLOOD PRESSURE: 138 MMHG

## 2022-08-05 DIAGNOSIS — I82.551 CHRONIC DEEP VEIN THROMBOSIS (DVT) OF RIGHT PERONEAL VEIN: ICD-10-CM

## 2022-08-05 DIAGNOSIS — I26.99 BILATERAL PULMONARY EMBOLISM: ICD-10-CM

## 2022-08-05 DIAGNOSIS — R74.8 ELEVATED LIVER ENZYMES: ICD-10-CM

## 2022-08-05 DIAGNOSIS — Z79.01 CHRONIC ANTICOAGULATION: ICD-10-CM

## 2022-08-05 DIAGNOSIS — I26.99 BILATERAL PULMONARY EMBOLISM: Primary | ICD-10-CM

## 2022-08-05 LAB
ALBUMIN SERPL-MCNC: 4.3 G/DL (ref 3.5–5.2)
ALBUMIN/GLOB SERPL: 1.7 G/DL (ref 1.1–2.4)
ALP SERPL-CCNC: 70 U/L (ref 38–116)
ALT SERPL W P-5'-P-CCNC: 16 U/L (ref 0–41)
ANION GAP SERPL CALCULATED.3IONS-SCNC: 10.4 MMOL/L (ref 5–15)
AST SERPL-CCNC: 18 U/L (ref 0–40)
BASOPHILS # BLD AUTO: 0.04 10*3/MM3 (ref 0–0.2)
BASOPHILS NFR BLD AUTO: 0.8 % (ref 0–1.5)
BILIRUB SERPL-MCNC: 0.3 MG/DL (ref 0.2–1.2)
BUN SERPL-MCNC: 17 MG/DL (ref 6–20)
BUN/CREAT SERPL: 14.7 (ref 7.3–30)
CALCIUM SPEC-SCNC: 9.2 MG/DL (ref 8.5–10.2)
CHLORIDE SERPL-SCNC: 104 MMOL/L (ref 98–107)
CO2 SERPL-SCNC: 25.6 MMOL/L (ref 22–29)
CREAT SERPL-MCNC: 1.16 MG/DL (ref 0.7–1.3)
DEPRECATED RDW RBC AUTO: 48.8 FL (ref 37–54)
EGFRCR SERPLBLD CKD-EPI 2021: 67.3 ML/MIN/1.73
EOSINOPHIL # BLD AUTO: 0.11 10*3/MM3 (ref 0–0.4)
EOSINOPHIL NFR BLD AUTO: 2.3 % (ref 0.3–6.2)
ERYTHROCYTE [DISTWIDTH] IN BLOOD BY AUTOMATED COUNT: 14.3 % (ref 12.3–15.4)
GLOBULIN UR ELPH-MCNC: 2.6 GM/DL (ref 1.8–3.5)
GLUCOSE SERPL-MCNC: 70 MG/DL (ref 74–124)
HCT VFR BLD AUTO: 45.4 % (ref 37.5–51)
HGB BLD-MCNC: 14.3 G/DL (ref 13–17.7)
IMM GRANULOCYTES # BLD AUTO: 0.01 10*3/MM3 (ref 0–0.05)
IMM GRANULOCYTES NFR BLD AUTO: 0.2 % (ref 0–0.5)
LYMPHOCYTES # BLD AUTO: 1.54 10*3/MM3 (ref 0.7–3.1)
LYMPHOCYTES NFR BLD AUTO: 32.1 % (ref 19.6–45.3)
MCH RBC QN AUTO: 29.1 PG (ref 26.6–33)
MCHC RBC AUTO-ENTMCNC: 31.5 G/DL (ref 31.5–35.7)
MCV RBC AUTO: 92.3 FL (ref 79–97)
MONOCYTES # BLD AUTO: 0.96 10*3/MM3 (ref 0.1–0.9)
MONOCYTES NFR BLD AUTO: 20 % (ref 5–12)
NEUTROPHILS NFR BLD AUTO: 2.14 10*3/MM3 (ref 1.7–7)
NEUTROPHILS NFR BLD AUTO: 44.6 % (ref 42.7–76)
NRBC BLD AUTO-RTO: 0 /100 WBC (ref 0–0.2)
PLATELET # BLD AUTO: 252 10*3/MM3 (ref 140–450)
PMV BLD AUTO: 9 FL (ref 6–12)
POTASSIUM SERPL-SCNC: 4.3 MMOL/L (ref 3.5–4.7)
PROT SERPL-MCNC: 6.9 G/DL (ref 6.3–8)
RBC # BLD AUTO: 4.92 10*6/MM3 (ref 4.14–5.8)
SODIUM SERPL-SCNC: 140 MMOL/L (ref 134–145)
WBC NRBC COR # BLD: 4.8 10*3/MM3 (ref 3.4–10.8)

## 2022-08-05 PROCEDURE — 36415 COLL VENOUS BLD VENIPUNCTURE: CPT

## 2022-08-05 PROCEDURE — 80053 COMPREHEN METABOLIC PANEL: CPT

## 2022-08-05 PROCEDURE — 85025 COMPLETE CBC W/AUTO DIFF WBC: CPT

## 2022-08-05 PROCEDURE — 99214 OFFICE O/P EST MOD 30 MIN: CPT | Performed by: INTERNAL MEDICINE

## 2022-08-05 NOTE — PROGRESS NOTES
"Subjective     CHIEF COMPLAINT:      Chief Complaint   Patient presents with   • Follow-up     No concerns       HISTORY OF PRESENT ILLNESS:     Jerad Torres is a 72 y.o. male patient who returns today for follow up on his DVT and history of PE.  He is on Xarelto 20 mg daily.  He is tolerating it well.  He is not having problem with bleeding or bruising.    Patient [place] had a [place]  He followed instructions and took a break every 2 hours.  He was wearing compression stockings while driving.  He developed significant swelling of the right lower extremity after he was standing for several hours attending a car show.  There was no pain in the leg.  No redness or warmth.  He rested and elevated his right leg.  The swelling improved the following day.  It did not recur afterwards.    Patient is not having chest pain or shortness of breath.    ROS:  Pertinent ROS is in the HPI.     Past medical, surgical, social and family history were reviewed.     MEDICATIONS:    Current Outpatient Medications:   •  loratadine (CLARITIN) 10 MG tablet, Take 10 mg by mouth As Needed., Disp: , Rfl:   •  rivaroxaban (XARELTO) 20 MG tablet, Take 1 tablet by mouth Daily., Disp: 90 tablet, Rfl: 3  Objective     VITAL SIGNS:     Vitals:    08/05/22 1040   BP: 138/80   Pulse: 53   Resp: 16   Temp: 97.1 °F (36.2 °C)   TempSrc: Temporal   SpO2: 98%   Weight: 88 kg (194 lb 1.6 oz)   Height: 180 cm (70.87\")   PainSc: 0-No pain     Body mass index is 27.17 kg/m².     Wt Readings from Last 5 Encounters:   08/05/22 88 kg (194 lb 1.6 oz)   01/17/22 86.7 kg (191 lb 1.6 oz)   12/27/21 86.8 kg (191 lb 6.4 oz)   11/11/21 86 kg (189 lb 8 oz)   11/10/20 97.1 kg (214 lb 1.6 oz)     PHYSICAL EXAMINATION:   GENERAL: The patient appears in good general condition, not in acute distress.   SKIN: No ecchymosis.  EYES: No jaundice. No pallor.  CHEST: Normal respiratory effort.  Lungs clear bilaterally.  No added sounds.  CVS: Normal " S1-S2.  No murmurs.  ABDOMEN: Soft. No tenderness. No Hepatomegaly. No Splenomegaly. No masses.  EXTREMITIES: No edema.  No calf tenderness.    DIAGNOSTIC DATA:     Results from last 7 days   Lab Units 08/05/22  0942   WBC 10*3/mm3 4.80   NEUTROS ABS 10*3/mm3 2.14   HEMOGLOBIN g/dL 14.3   HEMATOCRIT % 45.4   PLATELETS 10*3/mm3 252     Results from last 7 days   Lab Units 08/05/22  0942   SODIUM mmol/L 140   POTASSIUM mmol/L 4.3   CHLORIDE mmol/L 104   CO2 mmol/L 25.6   BUN mg/dL 17   CREATININE mg/dL 1.16   CALCIUM mg/dL 9.2   ALBUMIN g/dL 4.30   BILIRUBIN mg/dL 0.3   ALK PHOS U/L 70   ALT (SGPT) U/L 16   AST (SGOT) U/L 18   GLUCOSE mg/dL 70*       Assessment & Plan    *Right lower extremity deep vein thrombosis and bilateral pulmonary embolism diagnosed on 10/8/2019.    · The CT scan at [facility] reported bilateral extensive pulmonary embolism.    · The largest was in the right main pulmonary artery into the lobar arteries.    · There was suspected infarction in the left lung base.  · Venous doppler showed acute right lower extremity deep vein thrombosis in the femoral, popliteal and gastrocnemius veins.   · The DVT and PE developed after driving by car to [place].    · Patient was placed on IV heparin and discharged home on Xarelto.  · Venous doppler on 1/7/2020 showed the thrombosis to have become subacute.  · CT scan of the chest on 1/9/2020 showed complete resolution of the pulmonary embolism.   · Venous doppler on 5/14/2020 showed significant improvement in the DVT. There was a residual non- occlusive chronic thrombus in the right tibial-peroneal trunk.   · Xarelto was reduced to 10 mg daily on 11/10/2020.  · Patient started on 12/14/2021 having pain in the left lower extremity.  He was traveling at that time.  He later on started having pain in the right lower extremity.   · Venous Doppler on 12/19/2021 revealed acute DVT in the left popliteal vein.  · The dose of Xarelto was increased  to 20 mg daily on 12/19/2021.  · The patient was seen on 12/27/2021, he was complaining of severe pain in the left lower extremity.  He also had pain in the right leg.  · Xarelto was increased to 15 mg twice daily for 21 days to be changed to 20 mg daily afterwards.  · Venous Doppler on 12/28/2021 revealed chronic DVT in the right distal femoral and popliteal veins, chronic STP in the greater saphenous above the knee. There was a chronic STP in the left small saphenous veins.    · Patient is tolerating Xarelto.  He is not having problems with bleeding.  · Patient has no symptoms or signs to suggest recurrent thrombosis.  · He recently had transient development of right leg swelling that resolved with elevation and did not have other symptoms to suggest a new DVT.  · Exam of the right lower extremity today was unremarkable.    *Normochromic normocytic anemia.    · Hemoglobin was 14.9 on 11/3/2020  · Hemoglobin decreased to 13.5 on 11/11/2021.    · There was no evidence of iron or vitamin B12 deficiency anemia.    · Hemoglobin improved to 13.9 on 12/27/2021.  · No symptoms or signs of blood loss to explain the anemia.  · Hemoglobin is 14.3 today.    *Elevated ALT on 12/27/2021.    · AST was normal.  · The elevated liver enzymes may be attributed to the development of new DVT.  · CMP today revealed normal liver enzymes.    *Positive test for lupus anticoagulant, anti-phosphatidyl ethanolamine IgG antibody and anti-beta-2 glycoprotein IgG antibody on 10/24/2019.   · The positive lupus anticoagulant was most likely a false positive test due to Xarelto.  · the other 2 positive antibodies are concerning for an underlying antiphospholipid syndrome. However, only anti beta-2 glycoprotein antibody is considered clinically significant for a diagnosis of antiphospholipid syndrome.   · The patient's family history is positive for DVT in a sister who had a negative thrombophilia testing.   · Repeat testing on 1/7/2020 showed that  the anti beta-2 glycoprotein was still positive. The titer was slightly higher. Anticardiolipin antibody was negative.  · Since the patient was responding to Xarelto and the pulmonary embolism resolved, he was continued on Xarelto.  · Patient continues to have beta-2 glycoprotein IgG antibody on repeat testing. Anticardiolipin antibody was negative.  · Antiphosphatidyl ethanolamine antibody decreased from 33 to 18 U/mL.  · Beta-2 glycoprotein and anticardiolipin antibodies became negative on 11/3/2020.   · Therefore, the patient was continued on Xarelto.    PLAN:    1.  Continue Xarelto 20 mg daily.   2.  He will continue to use compression stockings when he travels.   3.  I will see him in follow-up in 9 months with CBC CMP.  He will contact us sooner if there are any new symptoms.       Kat Umaña MD  08/05/22

## 2022-08-24 ENCOUNTER — TELEPHONE (OUTPATIENT)
Dept: ONCOLOGY | Facility: CLINIC | Age: 72
End: 2022-08-24

## 2022-08-24 NOTE — TELEPHONE ENCOUNTER
Caller: MELVIN PERDUE    Relationship: Emergency Contact    Best call back number: [phone]    What is the best time to reach you: ASAP    Who are you requesting to speak with (clinical staff, provider,  specific staff member): NURSE    Do you know the name of the person who called:     What was the call regarding: PT WANTS TO ASK ABOUT STOPPING BLOOD THINNERS    Do you require a callback: YES

## 2022-08-26 NOTE — TELEPHONE ENCOUNTER
Informed patients wife that Dr. Umaña recommends holding Xarelto two days prior to the procedure and resuming the day after. She repeated back the instructions and v/u.

## 2023-03-06 ENCOUNTER — TELEPHONE (OUTPATIENT)
Dept: ONCOLOGY | Facility: CLINIC | Age: 73
End: 2023-03-06

## 2023-03-06 NOTE — TELEPHONE ENCOUNTER
Caller: Jerad Torres    Relationship: Self    Best call back number: [phone]    What is the best time to reach you: ASAP    Who are you requesting to speak with (clinical staff, provider,  specific staff member): SCHEDULING    What was the call regarding: PT NEEDS TO R/S FOLLOW UP WITH DR BOURNE, FROM MAY 4 TO MAY 9 IF POSSIBLE, MORNING OR EARLY AFTERNOON.    Do you require a callback: YES, PLEASE CALL BACK TO SCHEDULE, HUB UNABLE TO SCHEDULE FOLLOW UP 1 APPT.

## 2023-04-25 ENCOUNTER — OFFICE VISIT (OUTPATIENT)
Dept: ONCOLOGY | Facility: CLINIC | Age: 73
End: 2023-04-25
Payer: MEDICARE

## 2023-04-25 ENCOUNTER — LAB (OUTPATIENT)
Dept: LAB | Facility: HOSPITAL | Age: 73
End: 2023-04-25
Payer: MEDICARE

## 2023-04-25 VITALS
TEMPERATURE: 97.1 F | WEIGHT: 206.2 LBS | HEIGHT: 71 IN | RESPIRATION RATE: 18 BRPM | DIASTOLIC BLOOD PRESSURE: 82 MMHG | SYSTOLIC BLOOD PRESSURE: 153 MMHG | BODY MASS INDEX: 28.87 KG/M2 | HEART RATE: 58 BPM | OXYGEN SATURATION: 97 %

## 2023-04-25 DIAGNOSIS — I82.551 CHRONIC DEEP VEIN THROMBOSIS (DVT) OF RIGHT PERONEAL VEIN: ICD-10-CM

## 2023-04-25 DIAGNOSIS — Z79.01 CHRONIC ANTICOAGULATION: ICD-10-CM

## 2023-04-25 DIAGNOSIS — I26.99 BILATERAL PULMONARY EMBOLISM: Primary | ICD-10-CM

## 2023-04-25 DIAGNOSIS — M79.89 RIGHT LEG SWELLING: ICD-10-CM

## 2023-04-25 LAB
ALBUMIN SERPL-MCNC: 4.3 G/DL (ref 3.5–5.2)
ALBUMIN/GLOB SERPL: 1.5 G/DL (ref 1.1–2.4)
ALP SERPL-CCNC: 76 U/L (ref 38–116)
ALT SERPL W P-5'-P-CCNC: 16 U/L (ref 0–41)
ANION GAP SERPL CALCULATED.3IONS-SCNC: 10.6 MMOL/L (ref 5–15)
AST SERPL-CCNC: 16 U/L (ref 0–40)
BASOPHILS # BLD AUTO: 0.04 10*3/MM3 (ref 0–0.2)
BASOPHILS NFR BLD AUTO: 0.5 % (ref 0–1.5)
BILIRUB SERPL-MCNC: 0.4 MG/DL (ref 0.2–1.2)
BUN SERPL-MCNC: 13 MG/DL (ref 6–20)
BUN/CREAT SERPL: 11.1 (ref 7.3–30)
CALCIUM SPEC-SCNC: 9.5 MG/DL (ref 8.5–10.2)
CHLORIDE SERPL-SCNC: 103 MMOL/L (ref 98–107)
CO2 SERPL-SCNC: 27.4 MMOL/L (ref 22–29)
CREAT SERPL-MCNC: 1.17 MG/DL (ref 0.7–1.3)
DEPRECATED RDW RBC AUTO: 46.8 FL (ref 37–54)
EGFRCR SERPLBLD CKD-EPI 2021: 66.6 ML/MIN/1.73
EOSINOPHIL # BLD AUTO: 0.16 10*3/MM3 (ref 0–0.4)
EOSINOPHIL NFR BLD AUTO: 1.9 % (ref 0.3–6.2)
ERYTHROCYTE [DISTWIDTH] IN BLOOD BY AUTOMATED COUNT: 13.7 % (ref 12.3–15.4)
GLOBULIN UR ELPH-MCNC: 2.8 GM/DL (ref 1.8–3.5)
GLUCOSE SERPL-MCNC: 116 MG/DL (ref 74–124)
HCT VFR BLD AUTO: 46.4 % (ref 37.5–51)
HGB BLD-MCNC: 14.5 G/DL (ref 13–17.7)
IMM GRANULOCYTES # BLD AUTO: 0.03 10*3/MM3 (ref 0–0.05)
IMM GRANULOCYTES NFR BLD AUTO: 0.4 % (ref 0–0.5)
LYMPHOCYTES # BLD AUTO: 2.01 10*3/MM3 (ref 0.7–3.1)
LYMPHOCYTES NFR BLD AUTO: 24.4 % (ref 19.6–45.3)
MCH RBC QN AUTO: 29.1 PG (ref 26.6–33)
MCHC RBC AUTO-ENTMCNC: 31.3 G/DL (ref 31.5–35.7)
MCV RBC AUTO: 93.2 FL (ref 79–97)
MONOCYTES # BLD AUTO: 0.61 10*3/MM3 (ref 0.1–0.9)
MONOCYTES NFR BLD AUTO: 7.4 % (ref 5–12)
NEUTROPHILS NFR BLD AUTO: 5.39 10*3/MM3 (ref 1.7–7)
NEUTROPHILS NFR BLD AUTO: 65.4 % (ref 42.7–76)
NRBC BLD AUTO-RTO: 0 /100 WBC (ref 0–0.2)
PLATELET # BLD AUTO: 314 10*3/MM3 (ref 140–450)
PMV BLD AUTO: 9.3 FL (ref 6–12)
POTASSIUM SERPL-SCNC: 4.5 MMOL/L (ref 3.5–4.7)
PROT SERPL-MCNC: 7.1 G/DL (ref 6.3–8)
RBC # BLD AUTO: 4.98 10*6/MM3 (ref 4.14–5.8)
SODIUM SERPL-SCNC: 141 MMOL/L (ref 134–145)
WBC NRBC COR # BLD: 8.24 10*3/MM3 (ref 3.4–10.8)

## 2023-04-25 PROCEDURE — 85025 COMPLETE CBC W/AUTO DIFF WBC: CPT

## 2023-04-25 PROCEDURE — 80053 COMPREHEN METABOLIC PANEL: CPT

## 2023-04-25 NOTE — PROGRESS NOTES
"Subjective     CHIEF COMPLAINT:      Chief Complaint   Patient presents with   • Follow-up     Discuss sleep medication and rt leg swelling(every other day)     HISTORY OF PRESENT ILLNESS:     Jerad Torres is a 72 y.o. male patient who returns today for follow up on his lower extremity deep vein thrombosis.  He is on Xarelto 20 mg daily.  He is taking it regularly.  He is not having problems with bleeding or bruising.  He reports having intermittent swelling of the right leg.  The swelling develops every 2 to 3 days.  No redness or pain.    Patient reports having difficulty staying asleep.  He is taking melatonin.  However, he reports that he does take naps during the daytime.  We discussed limiting the naps to 12-2 PM and making them shorter.    ROS:  Pertinent ROS is in the HPI.     Past medical, surgical, social and family history were reviewed.     MEDICATIONS:    Current Outpatient Medications:   •  loratadine (CLARITIN) 10 MG tablet, Take 1 tablet by mouth As Needed., Disp: , Rfl:   •  rivaroxaban (XARELTO) 20 MG tablet, Take 1 tablet by mouth Daily., Disp: 90 tablet, Rfl: 3     Objective     VITAL SIGNS:     Vitals:    04/25/23 1521   BP: 153/82   Pulse: 58   Resp: 18   Temp: 97.1 °F (36.2 °C)   TempSrc: Temporal   SpO2: 97%   Weight: 93.5 kg (206 lb 3.2 oz)   Height: 180 cm (70.87\")   PainSc: 0-No pain     Body mass index is 28.87 kg/m².     Wt Readings from Last 5 Encounters:   04/25/23 93.5 kg (206 lb 3.2 oz)   08/05/22 88 kg (194 lb 1.6 oz)   01/17/22 86.7 kg (191 lb 1.6 oz)   12/27/21 86.8 kg (191 lb 6.4 oz)   11/11/21 86 kg (189 lb 8 oz)     PHYSICAL EXAMINATION:   GENERAL: The patient appears in good general condition, not in acute distress.   SKIN: No ecchymosis.  EYES: No jaundice. No pallor.  CHEST: Normal respiratory effort.  Lungs clear bilaterally.  No added sounds.  CVS: Normal S1-S2.  No murmurs.  ABDOMEN: Soft. No tenderness. No Hepatomegaly. No Splenomegaly. No masses.  EXTREMITIES: Right leg " is slightly larger than the left.  No calf tenderness.  No erythema or warmth.  There are some prominent veins in the lower extremities bilaterally.  No erythema or warmth.    DIAGNOSTIC DATA:     Results from last 7 days   Lab Units 04/25/23  1458   WBC 10*3/mm3 8.24   NEUTROS ABS 10*3/mm3 5.39   HEMOGLOBIN g/dL 14.5   HEMATOCRIT % 46.4   PLATELETS 10*3/mm3 314     Results from last 7 days   Lab Units 04/25/23  1458   SODIUM mmol/L 141   POTASSIUM mmol/L 4.5   CHLORIDE mmol/L 103   CO2 mmol/L 27.4   BUN mg/dL 13   CREATININE mg/dL 1.17   CALCIUM mg/dL 9.5   ALBUMIN g/dL 4.3   BILIRUBIN mg/dL 0.4   ALK PHOS U/L 76   ALT (SGPT) U/L 16   AST (SGOT) U/L 16   GLUCOSE mg/dL 116     Assessment & Plan    *Right lower extremity deep vein thrombosis and bilateral pulmonary embolism diagnosed on 10/8/2019.    · CT scan at [facility] reported bilateral extensive pulmonary embolism.    · The largest was in the right main pulmonary artery into the lobar arteries.    · There was suspected infarction in the left lung base.  · Venous doppler showed acute right lower extremity deep vein thrombosis in the femoral, popliteal and gastrocnemius veins.   · The DVT and PE developed after a car trip to [place].    · Patient was placed on IV heparin and discharged home on Xarelto.  · Venous doppler on 1/7/2020 showed the subacute thrombosis.  · CT scan of the chest on 1/9/2020 showed complete resolution of the PE.   · Venous doppler on 5/14/2020 showed significant improvement in the DVT. There was a residual non- occlusive chronic thrombus in the right tibial-peroneal trunk.   · Xarelto was reduced to 10 mg daily on 11/10/2020.  · Patient started on 12/14/2021 having pain in the left lower extremity.  He was traveling at that time.   · Venous Doppler on 12/19/2021 revealed acute DVT in the left popliteal vein.  · The dose of Xarelto was increased to 20 mg daily on 12/19/2021.  · The patient was seen on 12/27/2021, he  was complaining of severe pain in the left lower extremity.  He also had pain in the right leg.  · Xarelto was increased to 15 mg twice daily for 21 days to be changed to 20 mg daily afterwards.  · Venous Doppler on 12/28/2021 revealed chronic DVT in the right distal femoral and popliteal veins, chronic STP in the greater saphenous above the knee. There was a chronic STP in the left small saphenous veins.   · Patient is continuing anticoagulation with Xarelto.   · He is tolerating it without problem with bleeding or bruising.  · He is complaining of swelling of the right leg. It is intermittent.   · Exam today revealed no erythema or warmth. No symptoms or signs of new deep vein thrombosis.   · The swelling is likely secondary to valvular incompetence.   · He is wearing compression stockings when he travels and when he knows that he will be standing for prolonged period of time.    *Normochromic normocytic anemia.    · Hemoglobin was 14.9 on 11/3/2020  · Hemoglobin decreased to 13.5 on 11/11/2021.    · There was no evidence of iron or vitamin B12 deficiency anemia.    · Hemoglobin improved to 13.9 on 12/27/2021.  · No symptoms or signs of blood loss to explain the anemia.  · Hemoglobin is 14.5 today.    *Positive test for lupus anticoagulant, anti-phosphatidyl ethanolamine IgG antibody and anti-beta-2 glycoprotein IgG antibody on 10/24/2019.   · The positive lupus anticoagulant was most likely a false positive test due to Xarelto.  · the other 2 positive antibodies are concerning for an underlying antiphospholipid syndrome. However, only anti beta-2 glycoprotein antibody is considered clinically significant for a diagnosis of antiphospholipid syndrome.   · The patient's family history is positive for DVT in a sister who had a negative thrombophilia testing.   · Repeat testing on 1/7/2020 showed that the anti beta-2 glycoprotein was still positive. The titer was slightly higher. Anticardiolipin antibody was  negative.  · Since the patient was responding to Xarelto and the pulmonary embolism resolved, he was continued on Xarelto.  · Patient continues to have beta-2 glycoprotein IgG antibody on repeat testing. Anticardiolipin antibody was negative.  · Antiphosphatidyl ethanolamine antibody decreased from 33 to 18 U/mL.  · Beta-2 glycoprotein and anticardiolipin antibodies became negative on 11/3/2020.   · Therefore, the patient was continued on Xarelto.    PLAN:    1.  Continue Xarelto 20 mg daily.  2.  Obtain venous doppler of both lower extremities.   3.  I recommended wearing the compression stocking when he travels, when he spends a long time standing or sitting.   4.   If the doppler does not show new thrombosis, we will see him in follow up in 9 months with a CBC and CMP.  We will see him sooner if there are any changes.      Kat Umaña MD  04/25/23

## 2023-04-26 ENCOUNTER — HOSPITAL ENCOUNTER (OUTPATIENT)
Dept: CARDIOLOGY | Facility: HOSPITAL | Age: 73
Discharge: HOME | End: 2023-04-26
Payer: MEDICARE

## 2023-04-26 DIAGNOSIS — I82.551 CHRONIC DEEP VEIN THROMBOSIS (DVT) OF RIGHT PERONEAL VEIN: ICD-10-CM

## 2023-04-26 DIAGNOSIS — Z79.01 CHRONIC ANTICOAGULATION: ICD-10-CM

## 2023-04-26 DIAGNOSIS — I26.99 BILATERAL PULMONARY EMBOLISM: ICD-10-CM

## 2023-04-26 LAB
BH CV LOW VAS RIGHT GREATER SAPH AK VESSEL: 1
BH CV LOW VAS RIGHT POPLITEAL SPONT: 1
BH CV LOWER VASCULAR LEFT COMMON FEMORAL AUGMENT: NORMAL
BH CV LOWER VASCULAR LEFT COMMON FEMORAL COMPETENT: NORMAL
BH CV LOWER VASCULAR LEFT COMMON FEMORAL COMPRESS: NORMAL
BH CV LOWER VASCULAR LEFT COMMON FEMORAL PHASIC: NORMAL
BH CV LOWER VASCULAR LEFT COMMON FEMORAL SPONT: NORMAL
BH CV LOWER VASCULAR LEFT DISTAL FEMORAL COMPRESS: NORMAL
BH CV LOWER VASCULAR LEFT GASTRONEMIUS COMPRESS: NORMAL
BH CV LOWER VASCULAR LEFT GREATER SAPH AK COMPRESS: NORMAL
BH CV LOWER VASCULAR LEFT GREATER SAPH BK COMPRESS: NORMAL
BH CV LOWER VASCULAR LEFT LESSER SAPH COMPRESS: NORMAL
BH CV LOWER VASCULAR LEFT MID FEMORAL AUGMENT: NORMAL
BH CV LOWER VASCULAR LEFT MID FEMORAL COMPETENT: NORMAL
BH CV LOWER VASCULAR LEFT MID FEMORAL COMPRESS: NORMAL
BH CV LOWER VASCULAR LEFT MID FEMORAL PHASIC: NORMAL
BH CV LOWER VASCULAR LEFT MID FEMORAL SPONT: NORMAL
BH CV LOWER VASCULAR LEFT PERONEAL COMPRESS: NORMAL
BH CV LOWER VASCULAR LEFT POPLITEAL AUGMENT: NORMAL
BH CV LOWER VASCULAR LEFT POPLITEAL COMPETENT: NORMAL
BH CV LOWER VASCULAR LEFT POPLITEAL COMPRESS: NORMAL
BH CV LOWER VASCULAR LEFT POPLITEAL PHASIC: NORMAL
BH CV LOWER VASCULAR LEFT POPLITEAL SPONT: NORMAL
BH CV LOWER VASCULAR LEFT POSTERIOR TIBIAL COMPRESS: NORMAL
BH CV LOWER VASCULAR LEFT PROXIMAL FEMORAL COMPRESS: NORMAL
BH CV LOWER VASCULAR LEFT SAPHENOFEMORAL JUNCTION COMPRESS: NORMAL
BH CV LOWER VASCULAR RIGHT COMMON FEMORAL AUGMENT: NORMAL
BH CV LOWER VASCULAR RIGHT COMMON FEMORAL COMPETENT: NORMAL
BH CV LOWER VASCULAR RIGHT COMMON FEMORAL COMPRESS: NORMAL
BH CV LOWER VASCULAR RIGHT COMMON FEMORAL PHASIC: NORMAL
BH CV LOWER VASCULAR RIGHT COMMON FEMORAL SPONT: NORMAL
BH CV LOWER VASCULAR RIGHT DISTAL FEMORAL COMPRESS: NORMAL
BH CV LOWER VASCULAR RIGHT GASTRONEMIUS COMPRESS: NORMAL
BH CV LOWER VASCULAR RIGHT GREATER SAPH AK COMPRESS: NORMAL
BH CV LOWER VASCULAR RIGHT GREATER SAPH AK THROMBUS: NORMAL
BH CV LOWER VASCULAR RIGHT GREATER SAPH BK COMPRESS: NORMAL
BH CV LOWER VASCULAR RIGHT LESSER SAPH COMPRESS: NORMAL
BH CV LOWER VASCULAR RIGHT MID FEMORAL AUGMENT: NORMAL
BH CV LOWER VASCULAR RIGHT MID FEMORAL COMPETENT: NORMAL
BH CV LOWER VASCULAR RIGHT MID FEMORAL COMPRESS: NORMAL
BH CV LOWER VASCULAR RIGHT MID FEMORAL PHASIC: NORMAL
BH CV LOWER VASCULAR RIGHT MID FEMORAL SPONT: NORMAL
BH CV LOWER VASCULAR RIGHT PERONEAL COMPRESS: NORMAL
BH CV LOWER VASCULAR RIGHT POPLITEAL AUGMENT: NORMAL
BH CV LOWER VASCULAR RIGHT POPLITEAL COMPETENT: NORMAL
BH CV LOWER VASCULAR RIGHT POPLITEAL COMPRESS: NORMAL
BH CV LOWER VASCULAR RIGHT POPLITEAL PHASIC: NORMAL
BH CV LOWER VASCULAR RIGHT POPLITEAL SPONT: NORMAL
BH CV LOWER VASCULAR RIGHT POPLITEAL THROMBUS: NORMAL
BH CV LOWER VASCULAR RIGHT POSTERIOR TIBIAL COMPRESS: NORMAL
BH CV LOWER VASCULAR RIGHT PROXIMAL FEMORAL COMPRESS: NORMAL
BH CV LOWER VASCULAR RIGHT SAPHENOFEMORAL JUNCTION COMPRESS: NORMAL
MAXIMAL PREDICTED HEART RATE: 149 BPM
STRESS TARGET HR: 127 BPM

## 2023-04-26 PROCEDURE — 93970 EXTREMITY STUDY: CPT

## 2023-04-27 ENCOUNTER — TELEPHONE (OUTPATIENT)
Dept: ONCOLOGY | Facility: CLINIC | Age: 73
End: 2023-04-27
Payer: MEDICARE

## 2023-04-27 NOTE — TELEPHONE ENCOUNTER
----- Message from Kat Umaña MD sent at 4/26/2023  8:18 PM EDT -----  Please inform the patient that the doppler showed old blood clots in the right lower extremity (popliteal and greater saphenous - superficial clot). No new blood clots.     Thank you

## 2023-05-16 ENCOUNTER — TELEPHONE (OUTPATIENT)
Dept: ONCOLOGY | Facility: CLINIC | Age: 73
End: 2023-05-16
Payer: MEDICARE

## 2023-05-16 NOTE — TELEPHONE ENCOUNTER
Patients wife called to report patient has been experiencing high blood pressures since last week starting at 150s/80s. Today his pressure has increased to 183/102. The patient has an appt with his PCP today to be evaluated. The patient and wife wanted to update Dr. Umaña on the patients status.

## 2023-05-19 RX ORDER — RIVAROXABAN 20 MG/1
TABLET, FILM COATED ORAL
Qty: 90 TABLET | Refills: 3 | Status: SHIPPED | OUTPATIENT
Start: 2023-05-19

## 2023-11-15 ENCOUNTER — TRANSCRIBE ORDERS (OUTPATIENT)
Dept: ADMINISTRATIVE | Facility: HOSPITAL | Age: 73
End: 2023-11-15
Payer: MEDICARE

## 2023-11-15 ENCOUNTER — HOSPITAL ENCOUNTER (OUTPATIENT)
Facility: HOSPITAL | Age: 73
Setting detail: HOSPITAL OUTPATIENT SURGERY
Discharge: HOME | End: 2023-11-15
Admitting: STUDENT IN AN ORGANIZED HEALTH CARE EDUCATION/TRAINING PROGRAM
Payer: MEDICARE

## 2023-11-15 DIAGNOSIS — M79.89 SWELLING OF RIGHT LOWER EXTREMITY: ICD-10-CM

## 2023-11-15 DIAGNOSIS — M79.89 SWELLING OF RIGHT EXTREMITY: Primary | ICD-10-CM

## 2023-11-15 LAB
BH CV LOW VAS RIGHT POPLITEAL SPONT: 1
BH CV LOWER VASCULAR LEFT COMMON FEMORAL AUGMENT: NORMAL
BH CV LOWER VASCULAR LEFT COMMON FEMORAL COMPETENT: NORMAL
BH CV LOWER VASCULAR LEFT COMMON FEMORAL COMPRESS: NORMAL
BH CV LOWER VASCULAR LEFT COMMON FEMORAL PHASIC: NORMAL
BH CV LOWER VASCULAR LEFT COMMON FEMORAL SPONT: NORMAL
BH CV LOWER VASCULAR RIGHT COMMON FEMORAL AUGMENT: NORMAL
BH CV LOWER VASCULAR RIGHT COMMON FEMORAL COMPETENT: NORMAL
BH CV LOWER VASCULAR RIGHT COMMON FEMORAL COMPRESS: NORMAL
BH CV LOWER VASCULAR RIGHT COMMON FEMORAL PHASIC: NORMAL
BH CV LOWER VASCULAR RIGHT COMMON FEMORAL SPONT: NORMAL
BH CV LOWER VASCULAR RIGHT DISTAL FEMORAL COMPRESS: NORMAL
BH CV LOWER VASCULAR RIGHT GASTRONEMIUS COMPRESS: NORMAL
BH CV LOWER VASCULAR RIGHT GREATER SAPH AK COMPRESS: NORMAL
BH CV LOWER VASCULAR RIGHT GREATER SAPH BK COMPRESS: NORMAL
BH CV LOWER VASCULAR RIGHT LESSER SAPH COMPRESS: NORMAL
BH CV LOWER VASCULAR RIGHT MID FEMORAL AUGMENT: NORMAL
BH CV LOWER VASCULAR RIGHT MID FEMORAL COMPETENT: NORMAL
BH CV LOWER VASCULAR RIGHT MID FEMORAL COMPRESS: NORMAL
BH CV LOWER VASCULAR RIGHT MID FEMORAL PHASIC: NORMAL
BH CV LOWER VASCULAR RIGHT MID FEMORAL SPONT: NORMAL
BH CV LOWER VASCULAR RIGHT PERONEAL COMPRESS: NORMAL
BH CV LOWER VASCULAR RIGHT POPLITEAL AUGMENT: NORMAL
BH CV LOWER VASCULAR RIGHT POPLITEAL COMPETENT: NORMAL
BH CV LOWER VASCULAR RIGHT POPLITEAL COMPRESS: NORMAL
BH CV LOWER VASCULAR RIGHT POPLITEAL PHASIC: NORMAL
BH CV LOWER VASCULAR RIGHT POPLITEAL SPONT: NORMAL
BH CV LOWER VASCULAR RIGHT POPLITEAL THROMBUS: NORMAL
BH CV LOWER VASCULAR RIGHT POSTERIOR TIBIAL COMPRESS: NORMAL
BH CV LOWER VASCULAR RIGHT PROXIMAL FEMORAL COMPRESS: NORMAL
BH CV LOWER VASCULAR RIGHT SAPHENOFEMORAL JUNCTION COMPRESS: NORMAL

## 2023-11-15 PROCEDURE — 93971 EXTREMITY STUDY: CPT

## 2023-11-30 ENCOUNTER — TELEPHONE (OUTPATIENT)
Dept: ONCOLOGY | Facility: CLINIC | Age: 73
End: 2023-11-30
Payer: MEDICARE

## 2023-11-30 NOTE — TELEPHONE ENCOUNTER
Caller: MELVIN PERDUE    Relationship: Emergency Contact    Best call back number: [phone]    What is the best time to reach you: ANYTIME    Who are you requesting to speak with (clinical staff, provider,  specific staff member): CLINICAL     Do you know the name of the person who called: MELVIN    What was the call regarding: PATIENT SEEN DR. MAO GARCIA ON 11/15/2023 AND THEY SENT HIM FOR A DUPLEX AND THEY GOT A CALL THAT HIS RIGHT LEG  HAS A DVT IN THE POPLITEAL VEIN, THAT WAS SEEN IN THE 4/26/2023 DUPLEX.     UPDATED CARE EVERYWHERE TO SEE THE RECORDS FROM [facility].    HAVE A BH VERBAL ON FILE BUT NONE OF THE BOXES ARE CHECKED.    CALL TO ADVISE     Is it okay if the provider responds through MyChart:

## 2023-11-30 NOTE — TELEPHONE ENCOUNTER
Patients PCP ordered a doppler and his doppler read: ·  Chronic right lower extremity deep vein thrombosis noted in the popliteal.  ·  There was superficial venous valvular incompetence noted in the right saphenofemoral junction.  ·  All other right sided veins appeared normal.    Patient has not missed any Xarelto doses and it appears it is the same chronic clot but he was called today to reach out to us and he wanted to see what Dr. Umaña thought and I let them know I would talk with him and get back to them. They v/u.

## 2023-12-01 NOTE — TELEPHONE ENCOUNTER
I reviewed the venous Doppler and the popliteal vein thrombosis is chronic and was present in the  venous Doppler in April.  There was a blood clot in a superficial vein that has since resolved.  Since there are no new blood clots, I recommend continuing Xarelto.  I do not recommend switching to a different blood thinner.    Thank you     Called the patient to let him know the plan from Dr. Umaña above and patient v/u.

## 2024-01-30 ENCOUNTER — LAB (OUTPATIENT)
Dept: LAB | Facility: HOSPITAL | Age: 74
End: 2024-01-30
Payer: MEDICARE

## 2024-01-30 ENCOUNTER — TELEPHONE (OUTPATIENT)
Dept: ONCOLOGY | Facility: CLINIC | Age: 74
End: 2024-01-30
Payer: MEDICARE

## 2024-01-30 ENCOUNTER — OFFICE VISIT (OUTPATIENT)
Dept: ONCOLOGY | Facility: CLINIC | Age: 74
End: 2024-01-30
Payer: MEDICARE

## 2024-01-30 VITALS
BODY MASS INDEX: 28.7 KG/M2 | OXYGEN SATURATION: 99 % | WEIGHT: 205 LBS | RESPIRATION RATE: 16 BRPM | HEART RATE: 51 BPM | DIASTOLIC BLOOD PRESSURE: 94 MMHG | TEMPERATURE: 98 F | SYSTOLIC BLOOD PRESSURE: 167 MMHG | HEIGHT: 71 IN

## 2024-01-30 DIAGNOSIS — M79.89 RIGHT LEG SWELLING: ICD-10-CM

## 2024-01-30 DIAGNOSIS — I82.551 CHRONIC DEEP VEIN THROMBOSIS (DVT) OF RIGHT PERONEAL VEIN: ICD-10-CM

## 2024-01-30 DIAGNOSIS — Z86.2 HISTORY OF ANEMIA: ICD-10-CM

## 2024-01-30 DIAGNOSIS — Z79.01 CHRONIC ANTICOAGULATION: ICD-10-CM

## 2024-01-30 DIAGNOSIS — I82.551 CHRONIC DEEP VEIN THROMBOSIS (DVT) OF RIGHT PERONEAL VEIN: Primary | ICD-10-CM

## 2024-01-30 DIAGNOSIS — I26.99 BILATERAL PULMONARY EMBOLISM: ICD-10-CM

## 2024-01-30 LAB
ALBUMIN SERPL-MCNC: 4.1 G/DL (ref 3.5–5.2)
ALBUMIN/GLOB SERPL: 1.6 G/DL
ALP SERPL-CCNC: 69 U/L (ref 39–117)
ALT SERPL W P-5'-P-CCNC: 8 U/L (ref 1–41)
ANION GAP SERPL CALCULATED.3IONS-SCNC: 8.7 MMOL/L (ref 5–15)
AST SERPL-CCNC: 16 U/L (ref 1–40)
BASOPHILS # BLD AUTO: 0.05 10*3/MM3 (ref 0–0.2)
BASOPHILS NFR BLD AUTO: 0.6 % (ref 0–1.5)
BILIRUB SERPL-MCNC: 0.4 MG/DL (ref 0–1.2)
BUN SERPL-MCNC: 13 MG/DL (ref 8–23)
BUN/CREAT SERPL: 11.5 (ref 7–25)
CALCIUM SPEC-SCNC: 9.1 MG/DL (ref 8.6–10.5)
CHLORIDE SERPL-SCNC: 105 MMOL/L (ref 98–107)
CO2 SERPL-SCNC: 28.3 MMOL/L (ref 22–29)
CREAT SERPL-MCNC: 1.13 MG/DL (ref 0.76–1.27)
DEPRECATED RDW RBC AUTO: 46.5 FL (ref 37–54)
EGFRCR SERPLBLD CKD-EPI 2021: 69.1 ML/MIN/1.73
EOSINOPHIL # BLD AUTO: 0.2 10*3/MM3 (ref 0–0.4)
EOSINOPHIL NFR BLD AUTO: 2.5 % (ref 0.3–6.2)
ERYTHROCYTE [DISTWIDTH] IN BLOOD BY AUTOMATED COUNT: 13.6 % (ref 12.3–15.4)
GLOBULIN UR ELPH-MCNC: 2.6 GM/DL
GLUCOSE SERPL-MCNC: 83 MG/DL (ref 65–99)
HCT VFR BLD AUTO: 45.9 % (ref 37.5–51)
HGB BLD-MCNC: 14.6 G/DL (ref 13–17.7)
IMM GRANULOCYTES # BLD AUTO: 0.02 10*3/MM3 (ref 0–0.05)
IMM GRANULOCYTES NFR BLD AUTO: 0.3 % (ref 0–0.5)
LYMPHOCYTES # BLD AUTO: 2.15 10*3/MM3 (ref 0.7–3.1)
LYMPHOCYTES NFR BLD AUTO: 27.2 % (ref 19.6–45.3)
MCH RBC QN AUTO: 29.6 PG (ref 26.6–33)
MCHC RBC AUTO-ENTMCNC: 31.8 G/DL (ref 31.5–35.7)
MCV RBC AUTO: 93.1 FL (ref 79–97)
MONOCYTES # BLD AUTO: 0.67 10*3/MM3 (ref 0.1–0.9)
MONOCYTES NFR BLD AUTO: 8.5 % (ref 5–12)
NEUTROPHILS NFR BLD AUTO: 4.81 10*3/MM3 (ref 1.7–7)
NEUTROPHILS NFR BLD AUTO: 60.9 % (ref 42.7–76)
NRBC BLD AUTO-RTO: 0 /100 WBC (ref 0–0.2)
PLATELET # BLD AUTO: 267 10*3/MM3 (ref 140–450)
PMV BLD AUTO: 9.2 FL (ref 6–12)
POTASSIUM SERPL-SCNC: 4.5 MMOL/L (ref 3.5–5.2)
PROT SERPL-MCNC: 6.7 G/DL (ref 6–8.5)
RBC # BLD AUTO: 4.93 10*6/MM3 (ref 4.14–5.8)
SODIUM SERPL-SCNC: 142 MMOL/L (ref 136–145)
WBC NRBC COR # BLD AUTO: 7.9 10*3/MM3 (ref 3.4–10.8)

## 2024-01-30 PROCEDURE — 36415 COLL VENOUS BLD VENIPUNCTURE: CPT

## 2024-01-30 PROCEDURE — 80053 COMPREHEN METABOLIC PANEL: CPT

## 2024-01-30 PROCEDURE — 85025 COMPLETE CBC W/AUTO DIFF WBC: CPT

## 2024-01-30 NOTE — PROGRESS NOTES
"Subjective     CHIEF COMPLAINT:      Chief Complaint   Patient presents with    Follow-up     Swelling in rt leg     HISTORY OF PRESENT ILLNESS:     Jerad Torres is a 73 y.o. male patient who returns today for follow up on his right lower extremity deep vein thrombosis.  He is on Xarelto 20 mg daily.  He is tolerating it well.  He is not having problems with bleeding or bruising.  He reports noticing occasional swelling of the right leg.  He wears a compression stockings when he travels or when he is going to stand for prolonged period of time.  He reports that he travels frequently.  He takes a break every about 2 hours and walks for a few minutes.      ROS:  Pertinent ROS is in the HPI.     Past medical, surgical, social and family history were reviewed.     MEDICATIONS:    Current Outpatient Medications:     Xarelto 20 MG tablet, TAKE 1 TABLET DAILY, Disp: 90 tablet, Rfl: 3    Objective     VITAL SIGNS:     Vitals:    01/30/24 0942   BP: 167/94   Pulse: 51   Resp: 16   Temp: 98 °F (36.7 °C)   TempSrc: Oral   SpO2: 99%   Weight: 93 kg (205 lb)   Height: 180 cm (70.87\")   PainSc: 0-No pain     Body mass index is 28.7 kg/m².     Wt Readings from Last 5 Encounters:   01/30/24 93 kg (205 lb)   04/25/23 93.5 kg (206 lb 3.2 oz)   08/05/22 88 kg (194 lb 1.6 oz)   01/17/22 86.7 kg (191 lb 1.6 oz)   12/27/21 86.8 kg (191 lb 6.4 oz)     PHYSICAL EXAMINATION:   GENERAL: The patient appears in good general condition, not in acute distress.   SKIN: No ecchymosis.  EYES: No jaundice. No Pallor.    CHEST: Normal respiratory effort. Lungs clear bilaterally.  No added sounds.  CVS: Normal S1-S2.  No murmurs.  ABDOMEN: Soft. No tenderness. No Hepatomegaly. No Splenomegaly. No masses.  EXTREMITIES: Right leg is slightly larger than the left.  No calf tenderness.  No erythema or warmth.    DIAGNOSTIC DATA:     Results from last 7 days   Lab Units 01/30/24  0925   WBC 10*3/mm3 7.90   NEUTROS ABS 10*3/mm3 4.81   HEMOGLOBIN g/dL 14.6 "   HEMATOCRIT % 45.9   PLATELETS 10*3/mm3 267     Results from last 7 days   Lab Units 01/30/24  0925   SODIUM mmol/L 142   POTASSIUM mmol/L 4.5   CHLORIDE mmol/L 105   CO2 mmol/L 28.3   BUN mg/dL 13   CREATININE mg/dL 1.13   CALCIUM mg/dL 9.1   ALBUMIN g/dL 4.1   BILIRUBIN mg/dL 0.4   ALK PHOS U/L 69   ALT (SGPT) U/L 8   AST (SGOT) U/L 16   GLUCOSE mg/dL 83     Venous Doppler right lower extremity on 11/15/2023:    Chronic right lower extremity deep vein thrombosis noted in the popliteal.    There was superficial venous valvular incompetence noted in the right saphenofemoral junction.    All other right sided veins appeared normal.    Venous Doppler bilateral lower extremities on 4/26/2023:    Chronic right lower extremity deep vein thrombosis noted in the popliteal.    Chronic right lower extremity superficial thrombophlebitis noted in the great saphenous (above knee).    All other veins appeared normal bilaterally.    Assessment & Plan    *Right lower extremity deep vein thrombosis and bilateral pulmonary embolism diagnosed on 10/8/2019.    CT scan at [facility] reported bilateral extensive pulmonary embolism.    The largest was in the right main pulmonary artery into the lobar arteries.    There was suspected infarction in the left lung base.  Venous doppler showed acute right lower extremity deep vein thrombosis in the femoral, popliteal and gastrocnemius veins.   The DVT and PE developed after a car trip to [place].    Patient was placed on IV heparin and discharged home on Xarelto.  Venous doppler on 1/7/2020 showed the subacute thrombosis.  CT scan of the chest on 1/9/2020 showed complete resolution of the PE.   Venous doppler on 5/14/2020 showed significant improvement in the DVT. There was a residual non- occlusive chronic thrombus in the right tibial-peroneal trunk.   Xarelto was reduced to 10 mg daily on 11/10/2020.  Patient started on 12/14/2021 having pain in the left lower  extremity.  He was traveling at that time.   Venous Doppler on 12/19/2021 revealed acute DVT in the left popliteal vein.  The dose of Xarelto was increased to 20 mg daily on 12/19/2021.  The patient was seen on 12/27/2021, he was complaining of severe pain in the left lower extremity.  He also had pain in the right leg.  Xarelto was increased to 15 mg twice daily for 21 days to be changed to 20 mg daily afterwards.  Venous Doppler on 12/28/2021 revealed chronic DVT in the right distal femoral and popliteal veins, chronic STP in the greater saphenous above the knee. There was a chronic STP in the left small saphenous veins.   Venous Doppler bilateral lower extremities on 4/26/2023 revealed chronic DVT in the right popliteal.  There was chronic superficial thrombophlebitis in the right greater saphenous vein above the knee.  Venous Doppler on 11/15/2023 revealed chronic DVT in the right popliteal vein.  He is tolerating Xarelto.  He reports intermittent swelling of the legs.  Sometimes, the swelling develops in the morning.  He did not notice changes in the color or temperature of the skin.    *History of normochromic normocytic anemia.    Hemoglobin was 14.9 on 11/3/2020  Hemoglobin decreased to 13.5 on 11/11/2021.    There was no evidence of iron or vitamin B12 deficiency anemia.    Hemoglobin improved to 13.9 on 12/27/2021.  No symptoms or signs of blood loss to explain the anemia.  1/30/2024: Hemoglobin 14.6.    *Positive test for lupus anticoagulant, anti-phosphatidyl ethanolamine IgG antibody and anti-beta-2 glycoprotein IgG antibody on 10/24/2019.   The positive lupus anticoagulant was most likely a false positive test due to Xarelto.  the other 2 positive antibodies are concerning for an underlying antiphospholipid syndrome. However, only anti beta-2 glycoprotein antibody is considered clinically significant for a diagnosis of antiphospholipid syndrome.   The patient's family history is positive for DVT in a  sister who had a negative thrombophilia testing.   Repeat testing on 1/7/2020 showed that the anti beta-2 glycoprotein was still positive. The titer was slightly higher. Anticardiolipin antibody was negative.  Since the patient was responding to Xarelto and the pulmonary embolism resolved, he was continued on Xarelto.  Patient continues to have beta-2 glycoprotein IgG antibody on repeat testing. Anticardiolipin antibody was negative.  Antiphosphatidyl ethanolamine antibody decreased from 33 to 18 U/mL.  Beta-2 glycoprotein and anticardiolipin antibodies became negative on 11/3/2020.   Therefore, the patient was continued on Xarelto.    PLAN:    1.  Continue Xarelto 20 mg daily.   2.  Continue to wear compression stockings when he travels or when he stands for prolonged period of time.   3.  Avoid prolonged sitting.   4.  Follow-up in 1 year with CBC CMP.  We will see him sooner if there are any changes.           Kat Umaña MD  01/30/24

## 2024-09-25 ENCOUNTER — TELEPHONE (OUTPATIENT)
Dept: ONCOLOGY | Facility: CLINIC | Age: 74
End: 2024-09-25
Payer: MEDICARE

## 2025-01-24 RX ORDER — RIVAROXABAN 20 MG/1
20 TABLET, FILM COATED ORAL DAILY
Qty: 90 TABLET | Refills: 3 | Status: SHIPPED | OUTPATIENT
Start: 2025-01-24

## 2025-02-17 ENCOUNTER — OFFICE VISIT (OUTPATIENT)
Dept: ONCOLOGY | Facility: CLINIC | Age: 75
End: 2025-02-17
Payer: MEDICARE

## 2025-02-17 ENCOUNTER — LAB (OUTPATIENT)
Dept: LAB | Facility: HOSPITAL | Age: 75
End: 2025-02-17
Payer: MEDICARE

## 2025-02-17 VITALS
WEIGHT: 208.2 LBS | OXYGEN SATURATION: 96 % | BODY MASS INDEX: 27.59 KG/M2 | RESPIRATION RATE: 17 BRPM | DIASTOLIC BLOOD PRESSURE: 78 MMHG | SYSTOLIC BLOOD PRESSURE: 132 MMHG | TEMPERATURE: 97.4 F | HEART RATE: 68 BPM | HEIGHT: 73 IN

## 2025-02-17 DIAGNOSIS — Z79.01 CHRONIC ANTICOAGULATION: ICD-10-CM

## 2025-02-17 DIAGNOSIS — Z86.2 HISTORY OF ANEMIA: ICD-10-CM

## 2025-02-17 DIAGNOSIS — I10 ESSENTIAL HYPERTENSION: ICD-10-CM

## 2025-02-17 DIAGNOSIS — I82.551 CHRONIC DEEP VEIN THROMBOSIS (DVT) OF RIGHT PERONEAL VEIN: Primary | ICD-10-CM

## 2025-02-17 DIAGNOSIS — I82.551 CHRONIC DEEP VEIN THROMBOSIS (DVT) OF RIGHT PERONEAL VEIN: ICD-10-CM

## 2025-02-17 LAB
ALBUMIN SERPL-MCNC: 4.1 G/DL (ref 3.5–5.2)
ALBUMIN/GLOB SERPL: 1.5 G/DL
ALP SERPL-CCNC: 66 U/L (ref 39–117)
ALT SERPL W P-5'-P-CCNC: 30 U/L (ref 1–41)
ANION GAP SERPL CALCULATED.3IONS-SCNC: 12.2 MMOL/L (ref 5–15)
AST SERPL-CCNC: 25 U/L (ref 1–40)
BASOPHILS # BLD AUTO: 0.06 10*3/MM3 (ref 0–0.2)
BASOPHILS NFR BLD AUTO: 0.8 % (ref 0–1.5)
BILIRUB SERPL-MCNC: 0.4 MG/DL (ref 0–1.2)
BUN SERPL-MCNC: 17 MG/DL (ref 8–23)
BUN/CREAT SERPL: 16.5 (ref 7–25)
CALCIUM SPEC-SCNC: 9.3 MG/DL (ref 8.6–10.5)
CHLORIDE SERPL-SCNC: 101 MMOL/L (ref 98–107)
CO2 SERPL-SCNC: 26.8 MMOL/L (ref 22–29)
CREAT SERPL-MCNC: 1.03 MG/DL (ref 0.76–1.27)
DEPRECATED RDW RBC AUTO: 46.5 FL (ref 37–54)
EGFRCR SERPLBLD CKD-EPI 2021: 76.7 ML/MIN/1.73
EOSINOPHIL # BLD AUTO: 0.11 10*3/MM3 (ref 0–0.4)
EOSINOPHIL NFR BLD AUTO: 1.5 % (ref 0.3–6.2)
ERYTHROCYTE [DISTWIDTH] IN BLOOD BY AUTOMATED COUNT: 13.5 % (ref 12.3–15.4)
GLOBULIN UR ELPH-MCNC: 2.8 GM/DL
GLUCOSE SERPL-MCNC: 130 MG/DL (ref 65–99)
HCT VFR BLD AUTO: 47.7 % (ref 37.5–51)
HGB BLD-MCNC: 14.9 G/DL (ref 13–17.7)
IMM GRANULOCYTES # BLD AUTO: 0.03 10*3/MM3 (ref 0–0.05)
IMM GRANULOCYTES NFR BLD AUTO: 0.4 % (ref 0–0.5)
LYMPHOCYTES # BLD AUTO: 2.02 10*3/MM3 (ref 0.7–3.1)
LYMPHOCYTES NFR BLD AUTO: 28.3 % (ref 19.6–45.3)
MCH RBC QN AUTO: 29 PG (ref 26.6–33)
MCHC RBC AUTO-ENTMCNC: 31.2 G/DL (ref 31.5–35.7)
MCV RBC AUTO: 93 FL (ref 79–97)
MONOCYTES # BLD AUTO: 0.38 10*3/MM3 (ref 0.1–0.9)
MONOCYTES NFR BLD AUTO: 5.3 % (ref 5–12)
NEUTROPHILS NFR BLD AUTO: 4.54 10*3/MM3 (ref 1.7–7)
NEUTROPHILS NFR BLD AUTO: 63.7 % (ref 42.7–76)
NRBC BLD AUTO-RTO: 0 /100 WBC (ref 0–0.2)
PLATELET # BLD AUTO: 336 10*3/MM3 (ref 140–450)
PMV BLD AUTO: 9.5 FL (ref 6–12)
POTASSIUM SERPL-SCNC: 4.1 MMOL/L (ref 3.5–5.2)
PROT SERPL-MCNC: 6.9 G/DL (ref 6–8.5)
RBC # BLD AUTO: 5.13 10*6/MM3 (ref 4.14–5.8)
SODIUM SERPL-SCNC: 140 MMOL/L (ref 136–145)
WBC NRBC COR # BLD AUTO: 7.14 10*3/MM3 (ref 3.4–10.8)

## 2025-02-17 PROCEDURE — 85025 COMPLETE CBC W/AUTO DIFF WBC: CPT

## 2025-02-17 PROCEDURE — 80053 COMPREHEN METABOLIC PANEL: CPT

## 2025-02-17 PROCEDURE — 36415 COLL VENOUS BLD VENIPUNCTURE: CPT

## 2025-02-17 RX ORDER — LISINOPRIL 5 MG/1
1 TABLET ORAL DAILY
COMMUNITY
Start: 2025-01-28

## 2025-02-17 NOTE — PROGRESS NOTES
"Subjective     CHIEF COMPLAINT:      Chief Complaint   Patient presents with    Follow-up     HISTORY OF PRESENT ILLNESS:     Jerad Torres is a 74 y.o. male patient who returns today for follow up on his lower extremity DVT and PE.  He is on chronic anticoagulation with Xarelto.  He is tolerating it well.  No problem with bleeding or bruising.  He recently had labs with his PCP and was found to have elevated BUN indicating some degree of dehydration.  Increase his fluid intake afterwards.    Patient was recently diagnosed with hypertension.  He was started on lisinopril 5 mg daily.  Blood pressure did not improve after 2 weeks of this dose and the dose was therefore increased to 5 mg twice daily.  He has some elevated blood pressure readings related to stress.  His blood pressure machine was given a segment of irregular heartbeat.  His PCP checked on that and referred him to cardiology.  His appointment is in May.    ROS:  Pertinent ROS is in the HPI.     Past medical, surgical, social and family history were reviewed.     MEDICATIONS:    Current Outpatient Medications:     lisinopril (PRINIVIL,ZESTRIL) 5 MG tablet, Take 1 tablet by mouth Daily., Disp: , Rfl:     Xarelto 20 MG tablet, TAKE 1 TABLET DAILY, Disp: 90 tablet, Rfl: 3    Objective     VITAL SIGNS:     Vitals:    02/17/25 1532   BP: 132/78   Pulse: 68   Resp: 17   Temp: 97.4 °F (36.3 °C)   TempSrc: Oral   SpO2: 96%   Weight: 94.4 kg (208 lb 3.2 oz)   Height: 185.4 cm (73\")   PainSc: 0-No pain     Body mass index is 27.47 kg/m².     Wt Readings from Last 5 Encounters:   02/17/25 94.4 kg (208 lb 3.2 oz)   01/30/24 93 kg (205 lb)   04/25/23 93.5 kg (206 lb 3.2 oz)   08/05/22 88 kg (194 lb 1.6 oz)   01/17/22 86.7 kg (191 lb 1.6 oz)     PHYSICAL EXAMINATION:   GENERAL: The patient appears in good general condition, not in acute distress.   SKIN: No Ecchymosis.  EYES: No jaundice. No Pallor.  CHEST: Normal respiratory effort. Normal breathing sounds bilaterally. " No added sounds.  CVS: Regular rhythm.  Normal S1 and S2. No Murmur.  ABDOMEN: Nondistended.  EXTREMITIES: No joint deformity.     DIAGNOSTIC DATA:     Results from last 7 days   Lab Units 02/17/25  1525   WBC 10*3/mm3 7.14   NEUTROS ABS 10*3/mm3 4.54   HEMOGLOBIN g/dL 14.9   HEMATOCRIT % 47.7   PLATELETS 10*3/mm3 336     Results from last 7 days   Lab Units 02/17/25  1525   SODIUM mmol/L 140   POTASSIUM mmol/L 4.1   CHLORIDE mmol/L 101   CO2 mmol/L 26.8   BUN mg/dL 17   CREATININE mg/dL 1.03   CALCIUM mg/dL 9.3   ALBUMIN g/dL 4.1   BILIRUBIN mg/dL 0.4   ALK PHOS U/L 66   ALT (SGPT) U/L 30   AST (SGOT) U/L 25   GLUCOSE mg/dL 130*     Assessment & Plan    *Right lower extremity deep vein thrombosis and bilateral pulmonary embolism diagnosed on 10/8/2019.    CT scan at [facility] reported bilateral extensive pulmonary embolism.    The largest was in the right main pulmonary artery into the lobar arteries.    There was suspected infarction in the left lung base.  Venous doppler showed acute right lower extremity deep vein thrombosis in the femoral, popliteal and gastrocnemius veins.   The DVT and PE developed after a car trip to [place].    Patient was placed on IV heparin and discharged home on Xarelto.  Venous doppler on 1/7/2020 showed the subacute thrombosis.  CT scan of the chest on 1/9/2020 showed complete resolution of the PE.   Venous doppler on 5/14/2020 showed significant improvement in the DVT. There was a residual non- occlusive chronic thrombus in the right tibial-peroneal trunk.   Xarelto was reduced to 10 mg daily on 11/10/2020.  Patient started on 12/14/2021 having pain in the left lower extremity.  He was traveling at that time.   Venous Doppler on 12/19/2021 revealed acute DVT in the left popliteal vein.  The dose of Xarelto was increased to 20 mg daily on 12/19/2021.  The patient was seen on 12/27/2021, he was complaining of severe pain in the left lower extremity.  He also had  pain in the right leg.  Xarelto was increased to 15 mg twice daily for 21 days to be changed to 20 mg daily afterwards.  Venous Doppler on 12/28/2021 revealed chronic DVT in the right distal femoral and popliteal veins, chronic STP in the greater saphenous above the knee. There was a chronic STP in the left small saphenous veins.   Venous Doppler bilateral lower extremities on 4/26/2023 revealed chronic DVT in the right popliteal.  There was chronic superficial thrombophlebitis in the right greater saphenous vein above the knee.  Venous Doppler on 11/15/2023 revealed chronic DVT in the right popliteal vein.  He is doing well with no evidence of recurrent thrombosis.  He is tolerating Xarelto well.    *History of normochromic normocytic anemia.    Hemoglobin decreased to 13.5 on 11/11/2021.    There was no evidence of iron or vitamin B12 deficiency anemia.    Hemoglobin improved to 13.9 on 12/27/2021.  No symptoms or signs of blood loss to explain the anemia.  1/30/2024: Hemoglobin 14.6.  2/17/2025: Hemoglobin 14.9.    *Hypertension.  He was recently diagnosed with hypertension.  He was started on lisinopril.  He continues to have elevated blood pressure readings and the dose of lisinopril was increased to 5 mg twice daily.  The wife reports that she noticed the blood pressure machine given indicator of irregular heartbeat.  Previous PCP obtained EKG that did not show evidence of atrial fibrillation.  He was referred to cardiology in [place].  His appointment is in May.  Family history is positive for A-fib and 3 sisters.  Blood pressure was normal today.  Heart rate was regular.  I recommended close monitoring and if he has episodes of palpitations and irregular rate detected by the pulse oximeter, I recommended going to the ER for evaluation.    *Positive test for lupus anticoagulant, anti-phosphatidyl ethanolamine IgG antibody and anti-beta-2 glycoprotein IgG antibody on 10/24/2019.   The positive lupus  anticoagulant was most likely a false positive test due to Xarelto.  the other 2 positive antibodies are concerning for an underlying antiphospholipid syndrome. However, only anti beta-2 glycoprotein antibody is considered clinically significant for a diagnosis of antiphospholipid syndrome.   The patient's family history is positive for DVT in a sister who had a negative thrombophilia testing.   Repeat testing on 1/7/2020 showed that the anti beta-2 glycoprotein was still positive. The titer was slightly higher. Anticardiolipin antibody was negative.  Since the patient was responding to Xarelto and the pulmonary embolism resolved, he was continued on Xarelto.  Patient continues to have beta-2 glycoprotein IgG antibody on repeat testing. Anticardiolipin antibody was negative.  Antiphosphatidyl ethanolamine antibody decreased from 33 to 18 U/mL.  Beta-2 glycoprotein and anticardiolipin antibodies became negative on 11/3/2020.   Therefore, the patient was continued on Xarelto.    PLAN:    1.  Continue Xarelto 20 mg daily.  2.  Continue to maintain adequate hydration.  3.  He will continue to wear compressions stockings specially when he travels or with prolonged standing.    4.  We we will see him in follow-up in 1 year.  Will obtain CBC CMP.  Will see him sooner if there are any changes.        Kat Umaña MD  02/17/25

## 2025-04-09 ENCOUNTER — TRANSCRIBE ORDERS (OUTPATIENT)
Dept: ADMINISTRATIVE | Facility: HOSPITAL | Age: 75
End: 2025-04-09
Payer: MEDICARE

## 2025-04-09 DIAGNOSIS — M79.651 RIGHT THIGH PAIN: Primary | ICD-10-CM

## 2025-04-15 ENCOUNTER — HOSPITAL ENCOUNTER (OUTPATIENT)
Dept: CARDIOLOGY | Facility: HOSPITAL | Age: 75
Discharge: HOME | End: 2025-04-15
Admitting: NURSE PRACTITIONER
Payer: MEDICARE

## 2025-04-15 DIAGNOSIS — M79.651 RIGHT THIGH PAIN: ICD-10-CM

## 2025-04-15 PROCEDURE — 93971 EXTREMITY STUDY: CPT

## 2025-08-20 ENCOUNTER — TELEPHONE (OUTPATIENT)
Dept: ONCOLOGY | Facility: CLINIC | Age: 75
End: 2025-08-20
Payer: MEDICARE